# Patient Record
Sex: FEMALE | Race: BLACK OR AFRICAN AMERICAN | NOT HISPANIC OR LATINO | Employment: FULL TIME | URBAN - METROPOLITAN AREA
[De-identification: names, ages, dates, MRNs, and addresses within clinical notes are randomized per-mention and may not be internally consistent; named-entity substitution may affect disease eponyms.]

---

## 2017-01-23 ENCOUNTER — TELEPHONE (OUTPATIENT)
Dept: TRANSPLANT | Facility: CLINIC | Age: 62
End: 2017-01-23

## 2017-01-23 NOTE — TELEPHONE ENCOUNTER
----- Message from Radha Koch sent at 1/23/2017  8:34 AM CST -----  Contact: [facility]  Pt went in for labs this morning but didn't have orders please fax orders to  if needed contact # [phone]

## 2017-01-26 LAB
EXT ALBUMIN: 4.1
EXT ALKALINE PHOSPHATASE: 81
EXT ALT: 23
EXT AST: 26
EXT BASOPHIL%: 0.6
EXT BILIRUBIN TOTAL: 0.38
EXT BUN: 22
EXT CALCIUM: 9.9
EXT CHLORIDE: 103
EXT CO2: 26
EXT EOSINOPHIL%: 3.4
EXT GFR MDRD NON AF AMER: 54
EXT GGT: 26
EXT GLUCOSE: 100
EXT HEMATOCRIT: 39.1
EXT HEMOGLOBIN: 12.3
EXT LYMPH%: 40.6
EXT MONOCYTES%: 5.6
EXT PLATELETS: 231
EXT POTASSIUM: 5.2
EXT PROTEIN TOTAL: 7.6
EXT SEGS%: 49.5
EXT SODIUM: 141 MMOL/L
EXT TACROLIMUS LVL: 5.9
EXT WBC: 3.6

## 2017-01-29 ENCOUNTER — TELEPHONE (OUTPATIENT)
Dept: TRANSPLANT | Facility: CLINIC | Age: 62
End: 2017-01-29

## 2017-03-30 DIAGNOSIS — Z94.4 LIVER REPLACED BY TRANSPLANT: Primary | ICD-10-CM

## 2017-03-30 RX ORDER — URSODIOL 300 MG/1
CAPSULE ORAL
Qty: 60 CAPSULE | Refills: 11 | Status: SHIPPED | OUTPATIENT
Start: 2017-03-30 | End: 2017-03-30 | Stop reason: SDUPTHER

## 2017-03-30 RX ORDER — URSODIOL 300 MG/1
CAPSULE ORAL
Qty: 60 CAPSULE | Refills: 11 | Status: SHIPPED | OUTPATIENT
Start: 2017-03-30 | End: 2019-02-18

## 2017-04-19 LAB
EXT ALBUMIN: 4
EXT ALKALINE PHOSPHATASE: 95
EXT ALT: 37
EXT AST: 33
EXT BASOPHIL%: 0.5
EXT BILIRUBIN TOTAL: 0.32
EXT BUN: 29
EXT CALCIUM: 9.6
EXT CHLORIDE: 102
EXT CO2: 24
EXT CREATININE: 1.46 MG/DL
EXT EOSINOPHIL%: 3.2
EXT GFR MDRD AF AMER: 44
EXT GGT: 31
EXT GLUCOSE: 102
EXT HEMATOCRIT: 38.9
EXT HEMOGLOBIN: 12.1
EXT LYMPH%: 32.6
EXT MONOCYTES%: 8.4
EXT PLATELETS: 202
EXT POTASSIUM: 4.9
EXT PROTEIN TOTAL: 7.5
EXT SEGS%: 55.1
EXT SODIUM: 140 MMOL/L
EXT TACROLIMUS LVL: 4.7
EXT WBC: 4.4

## 2017-04-20 ENCOUNTER — TELEPHONE (OUTPATIENT)
Dept: TRANSPLANT | Facility: CLINIC | Age: 62
End: 2017-04-20

## 2017-07-25 LAB
EXT ALBUMIN: 4.1
EXT ALKALINE PHOSPHATASE: 87
EXT ALT: 24
EXT AST: 30
EXT BASOPHIL%: 38.8
EXT BILIRUBIN TOTAL: 0.44
EXT BUN: 27
EXT CALCIUM: 9.8
EXT CHLORIDE: 102
EXT CO2: 27
EXT CREATININE: 1.54 MG/DL
EXT EOSINOPHIL%: 3.5
EXT GFR MDRD AF AMER: 42
EXT GLUCOSE: 101
EXT HEMATOCRIT: 38.2
EXT HEMOGLOBIN: 12.2
EXT LYMPH%: 38.8
EXT MONOCYTES%: 8.3
EXT PLATELETS: 199
EXT POTASSIUM: 5.2
EXT PROTEIN TOTAL: 7.7
EXT SEGS%: 48.8
EXT SODIUM: 141 MMOL/L
EXT TACROLIMUS LVL: 5.8
EXT WBC: 4.6

## 2017-07-27 ENCOUNTER — TELEPHONE (OUTPATIENT)
Dept: TRANSPLANT | Facility: CLINIC | Age: 62
End: 2017-07-27

## 2017-08-03 DIAGNOSIS — Z94.4 STATUS POST LIVER TRANSPLANT: ICD-10-CM

## 2017-08-03 RX ORDER — MYCOPHENOLATE MOFETIL 250 MG/1
500 CAPSULE ORAL 2 TIMES DAILY
Qty: 120 CAPSULE | Refills: 11 | Status: SHIPPED | OUTPATIENT
Start: 2017-08-03 | End: 2018-08-21 | Stop reason: SDUPTHER

## 2017-08-03 NOTE — TELEPHONE ENCOUNTER
----- Message from Janessa Tejada sent at 8/3/2017  9:27 AM CDT -----  Contact: [facility] pharmacy  Calling to get a new prescription on the patient mycophenolate (CELLCEPT) 250 mg Cap () please call

## 2017-08-15 ENCOUNTER — OFFICE VISIT (OUTPATIENT)
Dept: TRANSPLANT | Facility: CLINIC | Age: 62
End: 2017-08-15
Payer: COMMERCIAL

## 2017-08-15 VITALS
HEIGHT: 67 IN | WEIGHT: 206.56 LBS | RESPIRATION RATE: 16 BRPM | HEART RATE: 65 BPM | DIASTOLIC BLOOD PRESSURE: 74 MMHG | SYSTOLIC BLOOD PRESSURE: 164 MMHG | OXYGEN SATURATION: 99 % | BODY MASS INDEX: 32.42 KG/M2 | TEMPERATURE: 99 F

## 2017-08-15 DIAGNOSIS — Z94.4 LIVER TRANSPLANTED: Primary | ICD-10-CM

## 2017-08-15 DIAGNOSIS — Z29.89 PROPHYLACTIC IMMUNOTHERAPY: ICD-10-CM

## 2017-08-15 PROCEDURE — 99213 OFFICE O/P EST LOW 20 MIN: CPT | Mod: S$GLB,,, | Performed by: NURSE PRACTITIONER

## 2017-08-15 PROCEDURE — 99999 PR PBB SHADOW E&M-EST. PATIENT-LVL III: CPT | Mod: PBBFAC,,, | Performed by: NURSE PRACTITIONER

## 2017-08-15 PROCEDURE — 3008F BODY MASS INDEX DOCD: CPT | Mod: S$GLB,,, | Performed by: NURSE PRACTITIONER

## 2017-08-15 NOTE — Clinical Note
August 15, 2017                     [facility] - Liver Transplant  [address]  [facility] [address]  Phone: [phone]   Patient: Aletha Hopkins   MR Number: [id number]   YOB: 1955   Date of Visit: 8/15/2017       Dear      Thank you for referring Aletha Hopkins to me for evaluation. Attached you will find relevant portions of my assessment and plan of care.    If you have questions, please do not hesitate to call me. I look forward to following Aletha Hopkins along with you.    Sincerely,    Kindra Fountain, NP    Enclosure    If you would like to receive this communication electronically, please contact [email or web address] or [phone] to request [employer] access.    [employer] is a tool which provides read-only access to select patient information with whom you have a relationship. Its easy to use and provides real time access to review your patients record including encounter summaries, notes, results, and demographic information.    If you feel you have received this communication in error or would no longer like to receive these types of communications, please e-mail [email or web address]

## 2017-08-15 NOTE — PROGRESS NOTES
Transplant Hepatology  Liver Transplant Recipient Follow-up    Transplant Date: 2009  UNOS Native Liver Dx: Cirrhosis: Autoimmune    Aletha is here for follow up of her liver transplant.    ORGAN: LIVER  Whole or Partial: whole liver  Donor Type:  - brain death  CDC High Risk: no  Donor CMV Status: Positive  Donor HCV Status: Negative  Donor HBcAb: Negative  Biliary Anastomosis:   Arterial Anatomy:   IVC reconstruction:   Portal vein status:     She has had the following complications since transplant: none.     Subjective:     Interval History: Aletha was last seen on 16. Currently, she is doing well. Current complaints include none.    Had some mild RI after tx but improved with decreased dose of tacro  Currently maintained on dual IS w/ tacro 2/2 and  bid  No c/o jaundice, dark urine, abdominal distension, edema    Health Maintenance  -Bone mineral density - last BMD ~, has upcoming appt with PCP  -Colonoscopy- reportedly normal ~ next due 2018  -Dermatology- educated on skin care   -HTN- BP today was 164/74- has upcoming appt w/ PCP     Review of Systems   Constitutional: Negative for activity change, appetite change, chills, diaphoresis, fatigue, fever and unexpected weight change.   HENT: Negative for facial swelling and nosebleeds.    Respiratory: Negative for cough, chest tightness and shortness of breath.    Cardiovascular: Negative for chest pain, palpitations and leg swelling.   Gastrointestinal: Negative for abdominal distention, abdominal pain, blood in stool, constipation, diarrhea, nausea and vomiting.   Musculoskeletal: Negative for neck pain and neck stiffness.   Skin: Negative for color change, pallor and rash.   Neurological: Negative for dizziness, tremors, syncope, weakness, light-headedness and headaches.   Hematological: Negative for adenopathy. Does not bruise/bleed easily.   Psychiatric/Behavioral: Negative for agitation, behavioral problems, confusion and  decreased concentration.       Objective:     Physical Exam   Constitutional: She is oriented to person, place, and time. She appears well-developed and well-nourished. No distress.   HENT:   Head: Normocephalic and atraumatic.   Eyes: Conjunctivae are normal. No scleral icterus.   Neck: Normal range of motion. Neck supple.   Cardiovascular: Normal rate.    Pulmonary/Chest: Effort normal.   Abdominal: Soft. She exhibits no distension and no mass. There is no tenderness. There is no rebound and no guarding.   Musculoskeletal: Normal range of motion.   Neurological: She is alert and oriented to person, place, and time. Coordination normal.   No asterixis   Skin: Skin is warm and dry. No rash noted. She is not diaphoretic. No erythema. No pallor.        Surgical incision healed well, no hernia appreciated   Psychiatric: She has a normal mood and affect. Her behavior is normal. Judgment and thought content normal.     Lab Results   Component Value Date    BILITOT 0.5 03/30/2009    AST 37 03/30/2009    ALT 84 (H) 03/30/2009    ALKPHOS 91 03/30/2009    CREATININE 1.3 03/30/2009    ALBUMIN 4.4 03/30/2009     Lab Results   Component Value Date    WBC 6.38 03/30/2009    HGB 9.0 (L) 03/30/2009    HCT 28.2 (L) 03/30/2009     03/30/2009     Lab Results   Component Value Date    TACROLIMUS 8.1 03/30/2009       Assessment/Plan:     1. Liver transplanted for AIH    2. Prophylactic immunotherapy        S/P liver transplant due to AIH : stable LFTs. Continue monitoring symptoms, labs and drug levels for drug-related toxicity and side effects  -continue with post transplant labs per protocol  IS: Chronic immunosuppressive medications for rejection prophylaxis at target.   no adjustment needed.   Maintenance:  -Instructed to f/u with PCP for regular health maintenance care including cancer screenings and BMD  -Reviewed need to avoid sun exposure with use of sunblock, hats, long sleeves related to increased risk of skin  cancers  -Recommend f/u with Dermatology for annual skin checks    RTC 1 year    CASSY KothariOS Patient Status  Functional Status: 100% - Normal, no complaints, no evidence of disease  Physical Capacity: No Limitations

## 2017-08-30 ENCOUNTER — TELEPHONE (OUTPATIENT)
Dept: TRANSPLANT | Facility: CLINIC | Age: 62
End: 2017-08-30

## 2017-08-30 DIAGNOSIS — Z94.4 LIVER REPLACED BY TRANSPLANT: ICD-10-CM

## 2017-08-30 RX ORDER — TACROLIMUS 1 MG/1
CAPSULE ORAL
Qty: 120 CAPSULE | Refills: 11 | Status: SHIPPED | OUTPATIENT
Start: 2017-08-30 | End: 2018-09-16 | Stop reason: SDUPTHER

## 2017-08-30 NOTE — TELEPHONE ENCOUNTER
----- Message from Kindra Fountain NP sent at 8/15/2017  2:27 PM CDT -----  rtc 1 year with Dr. GOMEZ

## 2017-09-01 ENCOUNTER — TELEPHONE (OUTPATIENT)
Dept: TRANSPLANT | Facility: CLINIC | Age: 62
End: 2017-09-01

## 2017-09-01 NOTE — TELEPHONE ENCOUNTER
----- Message from Kindra Fountain NP sent at 8/15/2017  4:32 PM CDT -----  Lets reduce tacro from 2/2 to 2/1 for elevated creatinine  Can recheck labs in 1 week    thanks  ----- Message -----  From: Bryson Brand MD  Sent: 8/15/2017   4:16 PM  To: Kindra Fountain NP    Reduce her tacro a little    ----- Message -----  From: Kindra Fountain NP  Sent: 8/15/2017   2:30 PM  To: Bryson Brand MD    I reviewed her labs from July shows consistent upward trend of creatinine, now 1.5,. LFTs are ok  She also has HTN and has not been taking her meds, instructed to check BP at home and restart  Hx of RI improved with decreased tacro  IS: tacro 2/2, cellcept 500 bid    Should we wait and see what October labs look like or would you suggest reducing tacro?

## 2017-10-23 ENCOUNTER — TELEPHONE (OUTPATIENT)
Dept: TRANSPLANT | Facility: CLINIC | Age: 62
End: 2017-10-23

## 2017-10-23 NOTE — TELEPHONE ENCOUNTER
----- Message from Janessa Tejada sent at 10/23/2017  3:15 PM CDT -----  Contact: [facility]   Calling to get a standing order faxed for this patient labs, please make sure the date is up to date.       Please fax  or call

## 2017-11-03 ENCOUNTER — TELEPHONE (OUTPATIENT)
Dept: TRANSPLANT | Facility: CLINIC | Age: 62
End: 2017-11-03

## 2017-11-03 LAB
EXT ALBUMIN: 4.2
EXT ALKALINE PHOSPHATASE: 88
EXT ALT: 22
EXT AST: 26
EXT BASOPHIL%: 1
EXT BILIRUBIN TOTAL: 0.44
EXT BUN: 29
EXT CALCIUM: 9.5
EXT CHLORIDE: 101
EXT CO2: 26
EXT CREATININE: 1.38 MG/DL
EXT EOSINOPHIL%: 2.6
EXT GFR MDRD AF AMER: 47
EXT GFR MDRD NON AF AMER: 47
EXT GLUCOSE: 97
EXT HEMATOCRIT: 38.2
EXT HEMOGLOBIN: 12.8
EXT LYMPH%: 34
EXT MONOCYTES%: 7.6
EXT PLATELETS: 194
EXT POTASSIUM: 4.7
EXT PROTEIN TOTAL: 7.3
EXT SEGS%: 54.4
EXT SODIUM: 141 MMOL/L
EXT TACROLIMUS LVL: 3.8
EXT WBC: 4.2

## 2018-01-31 LAB
EXT ALBUMIN: 4.2
EXT ALKALINE PHOSPHATASE: 81
EXT ALT: 33
EXT AST: 34
EXT BASOPHIL%: 0.9
EXT BILIRUBIN TOTAL: 0.38
EXT BUN: 32
EXT CALCIUM: 9.7
EXT CHLORIDE: 102
EXT CO2: 28
EXT CREATININE: 1.52 MG/DL
EXT EOSINOPHIL%: 2.4
EXT GFR MDRD AF AMER: 42
EXT GFR MDRD NON AF AMER: 42
EXT GGT: 30
EXT GLUCOSE: 105
EXT HEMATOCRIT: 37.7
EXT HEMOGLOBIN: 12.2
EXT LYMPH%: 36.3
EXT MONOCYTES%: 7.5
EXT PLATELETS: 200
EXT POTASSIUM: 5.2
EXT PROTEIN TOTAL: 7.6
EXT SEGS%: 52.7
EXT SODIUM: 140 MMOL/L
EXT TACROLIMUS LVL: 4.2
EXT WBC: 4.6

## 2018-02-01 ENCOUNTER — TELEPHONE (OUTPATIENT)
Dept: TRANSPLANT | Facility: CLINIC | Age: 63
End: 2018-02-01

## 2018-05-09 ENCOUNTER — TELEPHONE (OUTPATIENT)
Dept: TRANSPLANT | Facility: CLINIC | Age: 63
End: 2018-05-09

## 2018-05-15 LAB
EXT ALBUMIN: 4.1
EXT ALKALINE PHOSPHATASE: 81
EXT ALT: 22
EXT AST: 26
EXT BASOPHIL%: 0.7
EXT BILIRUBIN TOTAL: 0.47
EXT BUN: 24
EXT CALCIUM: 9.9
EXT CHLORIDE: 104
EXT CO2: 27
EXT CREATININE: 1.39 MG/DL
EXT EOSINOPHIL%: 3.9
EXT GFR MDRD AF AMER: 47
EXT GLUCOSE: 103
EXT HEMATOCRIT: 37.7
EXT HEMOGLOBIN: 12.2
EXT LYMPH%: 36.7
EXT MONOCYTES%: 7.7
EXT PLATELETS: 184
EXT POTASSIUM: 5.1
EXT PROTEIN TOTAL: 7.9
EXT SEGS%: 50.8
EXT SODIUM: 140 MMOL/L
EXT TACROLIMUS LVL: 4.7
EXT WBC: 4.3

## 2018-05-16 ENCOUNTER — TELEPHONE (OUTPATIENT)
Dept: TRANSPLANT | Facility: CLINIC | Age: 63
End: 2018-05-16

## 2018-08-20 ENCOUNTER — OFFICE VISIT (OUTPATIENT)
Dept: TRANSPLANT | Facility: CLINIC | Age: 63
End: 2018-08-20
Payer: COMMERCIAL

## 2018-08-20 VITALS
RESPIRATION RATE: 18 BRPM | TEMPERATURE: 98 F | SYSTOLIC BLOOD PRESSURE: 132 MMHG | HEIGHT: 67 IN | OXYGEN SATURATION: 99 % | HEART RATE: 64 BPM | BODY MASS INDEX: 33.32 KG/M2 | WEIGHT: 212.31 LBS | DIASTOLIC BLOOD PRESSURE: 77 MMHG

## 2018-08-20 DIAGNOSIS — Z94.4 LIVER TRANSPLANTED: ICD-10-CM

## 2018-08-20 DIAGNOSIS — E66.9 OBESITY, UNSPECIFIED CLASSIFICATION, UNSPECIFIED OBESITY TYPE, UNSPECIFIED WHETHER SERIOUS COMORBIDITY PRESENT: ICD-10-CM

## 2018-08-20 DIAGNOSIS — Z29.89 PROPHYLACTIC IMMUNOTHERAPY: Primary | ICD-10-CM

## 2018-08-20 PROCEDURE — 99214 OFFICE O/P EST MOD 30 MIN: CPT | Mod: S$GLB,,, | Performed by: INTERNAL MEDICINE

## 2018-08-20 PROCEDURE — 3008F BODY MASS INDEX DOCD: CPT | Mod: CPTII,S$GLB,, | Performed by: INTERNAL MEDICINE

## 2018-08-20 PROCEDURE — 99999 PR PBB SHADOW E&M-EST. PATIENT-LVL III: CPT | Mod: PBBFAC,,, | Performed by: INTERNAL MEDICINE

## 2018-08-20 NOTE — PROGRESS NOTES
Subjective:       Patient ID: Aletha Hopkins is a 62 y.o. female.    Chief Complaint: Liver Transplant Follow-up    HPI   I saw this 62 year old AA lady in the liver transplant follow up clinic. 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 She is now 9 years post OLT.    She has done well with no complications since then other than mild renal impairment which has improved with a decrease in the dose of her tacrolimus.    Labs: 2018  Creatinine 1.3  AST 22  ALT 26  ALP 81      Latest tac level was 4.7  She remains on tac 2 mg BID and MMF 500mg BID    Health Maintenance  -Bone mineral density - last BMD ~  -Colonoscopy- reportedly normal ~ next due 2018  -Dermatology- educated on skin care     Sees:  Gyn- Dr Juaquin Florentino  PCP- [facility]-  Dr Rosa Barriga- DEXA etc    Body mass index is 33.32 kg/m².      Lab Results   Component Value Date    ALT 84 (H) 2009     Past Medical History:   Diagnosis Date    Liver transplanted 2009    Autoimmune     History reviewed. No pertinent surgical history.  Current Outpatient Medications   Medication Sig    CALCIUM CARBONATE/VITAMIN D3 (CALCIUM WITH VITAMIN D3 ORAL) Take 1 capsule by mouth once daily. Bone strength calcium, magnesium, vit D3 and vit K    multivitamin (THERAGRAN) per tablet Take 1 tablet by mouth. 1 Tablet Oral Every day.  over the counter    tacrolimus (PROGRAF) 1 MG Cap TAKE 2 CAPSULES(2MG) BY MOUTH EVERY 12 HOURS (MORNING AND EVENING)    ursodiol (ACTIGALL) 300 mg capsule TAKE 1 CAPSULE BY MOUTH TWICE DAILY    mycophenolate (CELLCEPT) 250 mg Cap Take 2 capsules (500 mg total) by mouth 2 (two) times daily.     No current facility-administered medications for this visit.        Review of Systems   Constitutional: Negative for activity change, appetite change, chills, fatigue, fever and unexpected weight change.   HENT: Negative for ear pain, hearing loss, nosebleeds, sore throat and trouble  swallowing.    Eyes: Negative for redness and visual disturbance.   Respiratory: Negative for cough, chest tightness, shortness of breath and wheezing.    Cardiovascular: Negative for chest pain and palpitations.   Gastrointestinal: Negative for abdominal distention, abdominal pain, blood in stool, constipation, diarrhea, nausea and vomiting.   Genitourinary: Negative for difficulty urinating, dysuria, frequency, hematuria and urgency.   Musculoskeletal: Negative for arthralgias, back pain, gait problem, joint swelling and myalgias.   Skin: Negative for rash.   Neurological: Negative for tremors, seizures, speech difficulty, weakness and headaches.   Hematological: Negative for adenopathy.   Psychiatric/Behavioral: Negative for confusion, decreased concentration and sleep disturbance. The patient is not nervous/anxious.          Objective:      Physical Exam   Constitutional: She is oriented to person, place, and time. She appears well-developed and well-nourished.   HENT:   Head: Normocephalic and atraumatic.   Mouth/Throat: Oropharynx is clear and moist.   Eyes: Conjunctivae and EOM are normal. Pupils are equal, round, and reactive to light. No scleral icterus.   Neck: Normal range of motion. Neck supple.   Cardiovascular: Normal rate, regular rhythm and normal heart sounds.   Pulmonary/Chest: Effort normal and breath sounds normal.   Abdominal: Soft. Bowel sounds are normal. She exhibits no distension. There is no tenderness.   Musculoskeletal: Normal range of motion. She exhibits no edema.   Neurological: She is alert and oriented to person, place, and time.   Psychiatric: She has a normal mood and affect. Her behavior is normal.   Vitals reviewed.      Assessment:       1. Prophylactic immunotherapy    2. Obesity, unspecified classification, unspecified obesity type, unspecified whether serious comorbidity present    3. Liver transplanted for AIH        Plan:   1. No change in immunosuppresion  2. PCP and Gyne  doc locally- will follow with maintenance tests  3. Labs every 3 months    Clinic in 1 year.    Functional Status: 100% - Normal, no complaints, no evidence of disease  Physical Capacity: No Limitations

## 2018-08-21 DIAGNOSIS — Z94.4 STATUS POST LIVER TRANSPLANT: ICD-10-CM

## 2018-08-23 RX ORDER — MYCOPHENOLATE MOFETIL 250 MG/1
CAPSULE ORAL
Qty: 120 CAPSULE | Refills: 2 | Status: SHIPPED | OUTPATIENT
Start: 2018-08-23 | End: 2018-11-22 | Stop reason: SDUPTHER

## 2018-09-05 LAB
EXT ALBUMIN: 4.1
EXT ALKALINE PHOSPHATASE: 88
EXT ALT: 23
EXT AST: 28
EXT BASOPHIL%: 0.6
EXT BILIRUBIN TOTAL: 0.43
EXT BUN: 32
EXT CALCIUM: 10
EXT CHLORIDE: 102
EXT CO2: 27
EXT CREATININE: 1.56 MG/DL
EXT EOSINOPHIL%: 1.9
EXT GFR MDRD AF AMER: 41
EXT GLUCOSE: 98
EXT HEMATOCRIT: 37.4
EXT HEMOGLOBIN: 12
EXT LYMPH%: 38.7
EXT MONOCYTES%: 6.4
EXT PLATELETS: 196
EXT POTASSIUM: 5.1
EXT PROTEIN TOTAL: 7.7
EXT SEGS%: 52.2
EXT SODIUM: 140 MMOL/L
EXT TACROLIMUS LVL: 5
EXT WBC: 4.8

## 2018-09-06 ENCOUNTER — TELEPHONE (OUTPATIENT)
Dept: TRANSPLANT | Facility: CLINIC | Age: 63
End: 2018-09-06

## 2018-09-16 DIAGNOSIS — Z94.4 LIVER REPLACED BY TRANSPLANT: ICD-10-CM

## 2018-09-17 RX ORDER — TACROLIMUS 1 MG/1
CAPSULE ORAL
Qty: 120 CAPSULE | Refills: 0 | Status: SHIPPED | OUTPATIENT
Start: 2018-09-17 | End: 2018-10-10 | Stop reason: SDUPTHER

## 2018-10-10 DIAGNOSIS — Z94.4 LIVER REPLACED BY TRANSPLANT: ICD-10-CM

## 2018-10-10 RX ORDER — TACROLIMUS 1 MG/1
CAPSULE ORAL
Qty: 120 CAPSULE | Refills: 0 | Status: SHIPPED | OUTPATIENT
Start: 2018-10-10 | End: 2018-11-22 | Stop reason: SDUPTHER

## 2018-11-22 DIAGNOSIS — Z94.4 LIVER REPLACED BY TRANSPLANT: ICD-10-CM

## 2018-11-22 DIAGNOSIS — Z94.4 STATUS POST LIVER TRANSPLANT: ICD-10-CM

## 2018-11-22 RX ORDER — TACROLIMUS 1 MG/1
CAPSULE ORAL
Qty: 120 CAPSULE | Refills: 0 | Status: SHIPPED | OUTPATIENT
Start: 2018-11-22 | End: 2018-12-19 | Stop reason: SDUPTHER

## 2018-11-22 RX ORDER — MYCOPHENOLATE MOFETIL 250 MG/1
CAPSULE ORAL
Qty: 120 CAPSULE | Refills: 0 | Status: SHIPPED | OUTPATIENT
Start: 2018-11-22 | End: 2018-12-19 | Stop reason: SDUPTHER

## 2018-12-04 LAB
EXT ALBUMIN: 4.4
EXT ALKALINE PHOSPHATASE: 91
EXT ALT: 22
EXT AST: 24
EXT BASOPHIL%: 0.7
EXT BILIRUBIN TOTAL: 0.32
EXT BUN: 21
EXT CALCIUM: 1.35
EXT CHLORIDE: 103
EXT CO2: 26
EXT CREATININE: 1 MG/DL
EXT EOSINOPHIL%: 3.5
EXT GLUCOSE: 100
EXT HEMATOCRIT: 36.5
EXT HEMOGLOBIN: 11.9
EXT LYMPH%: 33.2
EXT MONOCYTES%: 7.5
EXT PLATELETS: 207
EXT POTASSIUM: 5.2
EXT PROTEIN TOTAL: 7.7
EXT SEGS%: 54.9
EXT SODIUM: 141 MMOL/L
EXT TACROLIMUS LVL: 5.7
EXT WBC: 4.3

## 2018-12-14 ENCOUNTER — TELEPHONE (OUTPATIENT)
Dept: TRANSPLANT | Facility: CLINIC | Age: 63
End: 2018-12-14

## 2018-12-19 DIAGNOSIS — Z94.4 STATUS POST LIVER TRANSPLANT: ICD-10-CM

## 2018-12-19 DIAGNOSIS — Z94.4 LIVER REPLACED BY TRANSPLANT: ICD-10-CM

## 2018-12-19 RX ORDER — MYCOPHENOLATE MOFETIL 250 MG/1
CAPSULE ORAL
Qty: 120 CAPSULE | Refills: 0 | Status: SHIPPED | OUTPATIENT
Start: 2018-12-19 | End: 2019-01-17 | Stop reason: SDUPTHER

## 2018-12-19 RX ORDER — TACROLIMUS 1 MG/1
CAPSULE ORAL
Qty: 120 CAPSULE | Refills: 0 | Status: SHIPPED | OUTPATIENT
Start: 2018-12-19 | End: 2019-01-17 | Stop reason: SDUPTHER

## 2019-01-17 DIAGNOSIS — Z94.4 LIVER REPLACED BY TRANSPLANT: ICD-10-CM

## 2019-01-17 DIAGNOSIS — Z94.4 STATUS POST LIVER TRANSPLANT: ICD-10-CM

## 2019-01-17 RX ORDER — MYCOPHENOLATE MOFETIL 250 MG/1
CAPSULE ORAL
Qty: 120 CAPSULE | Refills: 0 | Status: SHIPPED | OUTPATIENT
Start: 2019-01-17 | End: 2019-02-11 | Stop reason: SDUPTHER

## 2019-01-17 RX ORDER — TACROLIMUS 1 MG/1
CAPSULE ORAL
Qty: 120 CAPSULE | Refills: 0 | Status: SHIPPED | OUTPATIENT
Start: 2019-01-17 | End: 2019-02-11 | Stop reason: SDUPTHER

## 2019-02-11 DIAGNOSIS — Z94.4 STATUS POST LIVER TRANSPLANT: ICD-10-CM

## 2019-02-11 DIAGNOSIS — Z94.4 LIVER REPLACED BY TRANSPLANT: ICD-10-CM

## 2019-02-11 RX ORDER — TACROLIMUS 1 MG/1
CAPSULE ORAL
Qty: 120 CAPSULE | Refills: 0 | Status: SHIPPED | OUTPATIENT
Start: 2019-02-11 | End: 2020-01-24

## 2019-02-11 RX ORDER — MYCOPHENOLATE MOFETIL 250 MG/1
CAPSULE ORAL
Qty: 120 CAPSULE | Refills: 0 | Status: SHIPPED | OUTPATIENT
Start: 2019-02-11 | End: 2019-03-26 | Stop reason: SDUPTHER

## 2019-02-18 DIAGNOSIS — Z94.4 LIVER REPLACED BY TRANSPLANT: ICD-10-CM

## 2019-02-18 RX ORDER — URSODIOL 300 MG/1
CAPSULE ORAL
Qty: 60 CAPSULE | Refills: 11 | Status: SHIPPED | OUTPATIENT
Start: 2019-02-18 | End: 2020-02-24

## 2019-03-05 DIAGNOSIS — Z94.4 LIVER REPLACED BY TRANSPLANT: ICD-10-CM

## 2019-03-06 RX ORDER — TACROLIMUS 1 MG/1
CAPSULE ORAL
Qty: 120 CAPSULE | Refills: 0 | Status: SHIPPED | OUTPATIENT
Start: 2019-03-06 | End: 2019-03-31 | Stop reason: SDUPTHER

## 2019-03-26 DIAGNOSIS — Z94.4 STATUS POST LIVER TRANSPLANT: ICD-10-CM

## 2019-03-26 RX ORDER — MYCOPHENOLATE MOFETIL 250 MG/1
CAPSULE ORAL
Qty: 120 CAPSULE | Refills: 0 | Status: SHIPPED | OUTPATIENT
Start: 2019-03-26 | End: 2019-04-23 | Stop reason: SDUPTHER

## 2019-03-31 DIAGNOSIS — Z94.4 LIVER REPLACED BY TRANSPLANT: ICD-10-CM

## 2019-03-31 RX ORDER — TACROLIMUS 1 MG/1
CAPSULE ORAL
Qty: 120 CAPSULE | Refills: 0 | Status: SHIPPED | OUTPATIENT
Start: 2019-03-31 | End: 2019-04-23 | Stop reason: SDUPTHER

## 2019-04-01 LAB
BASOPHILS NFR BLD: 1 %
EOSINOPHIL NFR BLD: 3 %
EXT ALBUMIN: 4.3
EXT ALKALINE PHOSPHATASE: 86
EXT ALT: 21
EXT AST: 23
EXT BILIRUBIN TOTAL: 7.8
EXT BUN: 18
EXT CALCIUM: 9.8
EXT CHLORIDE: 101
EXT CO2: 27
EXT CREATININE: 1.24 MG/DL
EXT GLUCOSE: 97
EXT POTASSIUM: 4.4
EXT PROTEIN TOTAL: 7.8
EXT SODIUM: 140 MMOL/L
EXT TACROLIMUS LVL: 5.6
HCT VFR BLD AUTO: 39 % (ref 36–46)
HGB BLD-MCNC: 12 G/DL (ref 12–16)
NEUTROPHILS NFR BLD: 55.2 %
PLATELET # BLD AUTO: 192 K/?L (ref 150–399)
WBC: 4.2

## 2019-04-02 ENCOUNTER — TELEPHONE (OUTPATIENT)
Dept: TRANSPLANT | Facility: CLINIC | Age: 64
End: 2019-04-02

## 2019-04-23 DIAGNOSIS — Z94.4 LIVER REPLACED BY TRANSPLANT: ICD-10-CM

## 2019-04-23 DIAGNOSIS — Z94.4 STATUS POST LIVER TRANSPLANT: ICD-10-CM

## 2019-04-23 RX ORDER — TACROLIMUS 1 MG/1
CAPSULE ORAL
Qty: 120 CAPSULE | Refills: 0 | Status: SHIPPED | OUTPATIENT
Start: 2019-04-23 | End: 2019-06-03 | Stop reason: SDUPTHER

## 2019-04-23 RX ORDER — MYCOPHENOLATE MOFETIL 250 MG/1
CAPSULE ORAL
Qty: 120 CAPSULE | Refills: 0 | Status: SHIPPED | OUTPATIENT
Start: 2019-04-23 | End: 2019-06-03 | Stop reason: SDUPTHER

## 2019-06-03 DIAGNOSIS — Z94.4 LIVER REPLACED BY TRANSPLANT: ICD-10-CM

## 2019-06-03 DIAGNOSIS — Z94.4 STATUS POST LIVER TRANSPLANT: ICD-10-CM

## 2019-06-03 RX ORDER — TACROLIMUS 1 MG/1
CAPSULE ORAL
Qty: 120 CAPSULE | Refills: 0 | Status: SHIPPED | OUTPATIENT
Start: 2019-06-03 | End: 2019-07-02 | Stop reason: SDUPTHER

## 2019-06-03 RX ORDER — MYCOPHENOLATE MOFETIL 250 MG/1
CAPSULE ORAL
Qty: 120 CAPSULE | Refills: 0 | Status: SHIPPED | OUTPATIENT
Start: 2019-06-03 | End: 2019-10-31 | Stop reason: SDUPTHER

## 2019-07-02 DIAGNOSIS — Z94.4 LIVER REPLACED BY TRANSPLANT: ICD-10-CM

## 2019-07-02 RX ORDER — TACROLIMUS 1 MG/1
CAPSULE ORAL
Qty: 120 CAPSULE | Refills: 0 | Status: SHIPPED | OUTPATIENT
Start: 2019-07-02 | End: 2019-07-15 | Stop reason: SDUPTHER

## 2019-07-15 DIAGNOSIS — Z94.4 LIVER REPLACED BY TRANSPLANT: ICD-10-CM

## 2019-07-15 RX ORDER — TACROLIMUS 1 MG/1
2 CAPSULE ORAL EVERY 12 HOURS
Qty: 120 CAPSULE | Refills: 11 | Status: SHIPPED | OUTPATIENT
Start: 2019-07-15 | End: 2020-01-24

## 2019-07-15 NOTE — TELEPHONE ENCOUNTER
----- Message from Marilyn Lynn sent at 7/15/2019 11:26 AM CDT -----  Contact: [facility]  Please call [facility] at [phone]     Fax# [phone]    Refill request for tacrolimus (PROGRAF) 1 MG Cap    Thank you

## 2019-07-19 ENCOUNTER — TELEPHONE (OUTPATIENT)
Dept: TRANSPLANT | Facility: CLINIC | Age: 64
End: 2019-07-19

## 2019-07-19 LAB
EXT ALBUMIN: 4.2
EXT ALKALINE PHOSPHATASE: 88
EXT ALT: 44
EXT AST: 80
EXT BASOPHIL%: 0.6
EXT BILIRUBIN TOTAL: 0.56
EXT BUN: 23
EXT CALCIUM: 10.2
EXT CHLORIDE: 102
EXT CO2: 25
EXT CREATININE: 1.34 MG/DL
EXT EOSINOPHIL%: 3.4
EXT GLUCOSE: 106
EXT HEMATOCRIT: 37.9
EXT HEMOGLOBIN: 11.8
EXT LYMPH%: 53.4
EXT MONOCYTES%: 9.2
EXT PLATELETS: 197
EXT POTASSIUM: 5
EXT PROTEIN TOTAL: 7.7
EXT SEGS%: 53.4
EXT SODIUM: 141 MMOL/L
EXT TACROLIMUS LVL: 4.2
EXT WBC: 5.3

## 2019-07-19 NOTE — TELEPHONE ENCOUNTER
----- Message from Bryson Brand MD sent at 7/19/2019  4:27 PM CDT -----  Results reviewed  Repeat next week- LFTs are up

## 2019-07-25 ENCOUNTER — TELEPHONE (OUTPATIENT)
Dept: TRANSPLANT | Facility: CLINIC | Age: 64
End: 2019-07-25

## 2019-07-25 LAB
EXT ALBUMIN: 4.3
EXT ALKALINE PHOSPHATASE: 86
EXT ALT: 27
EXT AST: 25
EXT BASOPHIL%: 0.8
EXT BILIRUBIN TOTAL: 0.5
EXT BUN: 26
EXT CALCIUM: 10.2
EXT CHLORIDE: 101
EXT CO2: 27
EXT CREATININE: 1.49 MG/DL
EXT EOSINOPHIL%: 3.2
EXT GFR MDRD AF AMER: 43
EXT GFR MDRD NON AF AMER: 43
EXT GLUCOSE: 104
EXT HEMATOCRIT: 38.1
EXT HEMOGLOBIN: 12
EXT LYMPH%: 37.3
EXT MONOCYTES%: 6.7
EXT PLATELETS: 206
EXT POTASSIUM: 4.9
EXT PROTEIN TOTAL: 8.1
EXT SEGS%: 51.8
EXT SODIUM: 139 MMOL/L
EXT TACROLIMUS LVL: 3.9
EXT WBC: 4.8

## 2019-08-02 ENCOUNTER — TELEPHONE (OUTPATIENT)
Dept: TRANSPLANT | Facility: CLINIC | Age: 64
End: 2019-08-02

## 2019-08-02 NOTE — TELEPHONE ENCOUNTER
Patient sent me an email requesting lab review follow up.  Emailed patient that Dr. Guevara reviewed her labs and they have returned to stable.  Next labs 10/21/19

## 2019-09-27 ENCOUNTER — TELEPHONE (OUTPATIENT)
Dept: TRANSPLANT | Facility: CLINIC | Age: 64
End: 2019-09-27

## 2019-09-27 NOTE — TELEPHONE ENCOUNTER
----- Message from Missy Bolanos sent at 9/27/2019 10:17 AM CDT -----  Contact: pt  Pt calling to schedule annual appt       Pt contact [phone]

## 2019-10-21 ENCOUNTER — TELEPHONE (OUTPATIENT)
Dept: TRANSPLANT | Facility: CLINIC | Age: 64
End: 2019-10-21

## 2019-10-21 LAB
EXT ALBUMIN: 4.2
EXT ALKALINE PHOSPHATASE: 90
EXT ALT: 19
EXT AST: 22
EXT BASOPHIL%: 0.8
EXT BILIRUBIN TOTAL: 0.39
EXT BUN: 24
EXT CALCIUM: 9.8
EXT CHLORIDE: 102
EXT CO2: 26
EXT CREATININE: 1.33 MG/DL
EXT EOSINOPHIL%: 2.6
EXT GLUCOSE: 104
EXT HEMATOCRIT: 38.5
EXT HEMOGLOBIN: 12
EXT LYMPH%: 36.6
EXT MONOCYTES%: 8.8
EXT PLATELETS: 209
EXT POTASSIUM: 4.7
EXT PROTEIN TOTAL: 7.6
EXT SEGS%: 50.8
EXT SODIUM: 141 MMOL/L
EXT TACROLIMUS LVL: 5.2
EXT WBC: 5

## 2019-10-30 ENCOUNTER — OFFICE VISIT (OUTPATIENT)
Dept: TRANSPLANT | Facility: CLINIC | Age: 64
End: 2019-10-30
Payer: COMMERCIAL

## 2019-10-30 VITALS
WEIGHT: 211.44 LBS | RESPIRATION RATE: 16 BRPM | TEMPERATURE: 98 F | BODY MASS INDEX: 33.19 KG/M2 | DIASTOLIC BLOOD PRESSURE: 85 MMHG | OXYGEN SATURATION: 99 % | SYSTOLIC BLOOD PRESSURE: 154 MMHG | HEIGHT: 67 IN | HEART RATE: 67 BPM

## 2019-10-30 DIAGNOSIS — Z29.89 PROPHYLACTIC IMMUNOTHERAPY: ICD-10-CM

## 2019-10-30 DIAGNOSIS — Z94.4 LIVER TRANSPLANTED: Primary | ICD-10-CM

## 2019-10-30 DIAGNOSIS — E66.9 CLASS 1 OBESITY WITHOUT SERIOUS COMORBIDITY WITH BODY MASS INDEX (BMI) OF 32.0 TO 32.9 IN ADULT, UNSPECIFIED OBESITY TYPE: ICD-10-CM

## 2019-10-30 PROCEDURE — 3008F BODY MASS INDEX DOCD: CPT | Mod: S$GLB,,, | Performed by: NURSE PRACTITIONER

## 2019-10-30 PROCEDURE — 99999 PR PBB SHADOW E&M-EST. PATIENT-LVL IV: CPT | Mod: PBBFAC,,, | Performed by: NURSE PRACTITIONER

## 2019-10-30 PROCEDURE — 99999 PR PBB SHADOW E&M-EST. PATIENT-LVL IV: ICD-10-PCS | Mod: PBBFAC,,, | Performed by: NURSE PRACTITIONER

## 2019-10-30 PROCEDURE — 3008F PR BODY MASS INDEX (BMI) DOCUMENTED: ICD-10-PCS | Mod: S$GLB,,, | Performed by: NURSE PRACTITIONER

## 2019-10-30 PROCEDURE — 99214 OFFICE O/P EST MOD 30 MIN: CPT | Mod: S$GLB,,, | Performed by: NURSE PRACTITIONER

## 2019-10-30 PROCEDURE — 99214 PR OFFICE/OUTPT VISIT, EST, LEVL IV, 30-39 MIN: ICD-10-PCS | Mod: S$GLB,,, | Performed by: NURSE PRACTITIONER

## 2019-10-30 RX ORDER — AMOXICILLIN 500 MG
1 CAPSULE ORAL DAILY
COMMUNITY

## 2019-10-30 NOTE — PATIENT INSTRUCTIONS
1. Schedule colonoscopy  2. Dermatology apt for skin cancer screening  3. Transplant labs every 3 months. No changes to transplant meds.   4. Try decreasing daily carbohydrates (bread, crackers, pasta, rice). Replace with more proteins and vegetables.  5. Return to clinic in 1 year.       For itching:   Avoid:  - Excessive washing and bathing, which can increase skin dryness  - Contact with irritants  - Very hot and spicy foods    Apply:   - Topical lotions that contain tannins, camphor, or menthol (Sarna lotion)  - Skin moisturizers (Eucerin)   - Mild, nonalkaline soaps and lukewarm water. Try free/clear laundry detergents and non-fragrance soaps.   - Wet, cold, or moist wraps  - Aloe    Can try benadryl at night if itching persists.

## 2019-10-30 NOTE — PROGRESS NOTES
Transplant Hepatology  Liver Transplant Recipient Follow-up    Transplant Date: 1/6/2009  UNOS Native Liver Dx: Cirrhosis: Autoimmune    Aletha is here for follow up of her liver transplant.    ORGAN: LIVER  Whole or Partial: whole liver  Donor Type: donation after brain death  CDC High Risk: no  Donor CMV Status: Positive  Donor HCV Status: Negative  Donor HBcAb: Negative  Donor HBV TICO:   Donor HCV TICO:   Biliary Anastomosis:   Arterial Anatomy:   IVC reconstruction:   Portal vein status:     She has had the following complications since transplant: mild renal insufficiency which has improved with decreasing prograf dosing.     Subjective:     Interval History: Aletha was last seen on 8/20/18 by Dr. Brand. She is now 10 years s/p liver transplant for cirrhosis due to autoimmune hepatitis. Currently, she is doing well. Current complaints include intermittent itching, mainly abdomen and back. She has not tried anything for this. She also reports some limited ROM of her RUE; has some tightness in the muscles in her back though no pain. This has been present since transplant. Otherwise well with no new issues.     Interval hospitalizations: none     Current immunosuppression:   - Tacrolimus: 2 mg / 2 mg. Last prograf level 5.2.                      - MMF: 500 mg BID                                                                               Graft Function: excellent; LFTs normal   History of rejection: none    *On ursodiol for small ducts.     Post-LT Recommended Health Maintenance  Dermatology: due for skin CA screening                                      Colonoscopy: due this year                                          Bone Mineral Density (BMD): 4/2019 -- normal per patient. On Ca/Vit D.               HbA1C: No results found for: HGBA1C    BP today: 154/85   BMI: Body mass index is 32.87 kg/m².   *Walks 5-10 miles per day  Mammogram / pap: up to date        Review of Systems  Constitutional: Negative  for appetite change, chills, fatigue, fever and unexpected weight change.   Eyes: Negative for visual disturbance.   Respiratory: Negative for cough and shortness of breath.    Cardiovascular: Negative for chest pain, palpitations and leg swelling.   Gastrointestinal: Negative for abdominal distention, abdominal pain, blood in stool, constipation, diarrhea, nausea and vomiting. No change in stool color.  Genitourinary: Negative for dysuria and hematuria. Denies dark urine.   Musculoskeletal: Negative for arthralgias, gait problem, joint swelling and myalgias. (+) tight muscles in back, limited ROM to RUE.    Skin: Negative for color change, rash and wound. (+) itching.   Neurological: Negative for dizziness, tremors, speech difficulty, and headaches.   Hematological: Does not bruise/bleed easily.   Psychiatric/Behavioral: Negative for confusion, decreased concentration and sleep disturbance. Denies memory loss. Denies depression. The patient is not nervous/anxious.          Objective:     Physical Exam   Constitutional: Well-nourished. No distress. Alert and oriented to person, place, and time.  Eyes: No scleral icterus.   Cardiovascular: Normal rate, regular rhythm and normal heart sounds. No murmur heard.  Pulmonary/Chest: Respiratory effort and breath sounds normal. No respiratory distress.   Abdominal: Soft, non-tender. No distension; no ascites appreciated. There is no palpable hepatosplenomegaly. No hernia or mass. Well healed chevron scar.   Musculoskeletal: No edema.   Neurological: No tremor. Coordination and gait normal. No asterixis.    Skin: Skin is warm and dry. No rash or erythema. No jaundice. No telangiectasias or palmar erythema noted.  Psychiatric: Normal mood and affect. Speech, behavior, and thought content normal. No depression or anxiety noted.       Lab Results   Component Value Date    BILITOT 0.5 03/30/2009    AST 37 03/30/2009    ALT 84 (H) 03/30/2009    ALKPHOS 91 03/30/2009    CREATININE  1.3 03/30/2009    ALBUMIN 4.4 03/30/2009     Lab Results   Component Value Date    WBC 4.2 04/01/2019    HGB 12.0 04/01/2019    HCT 39 04/01/2019     04/01/2019     Lab Results   Component Value Date    TACROLIMUS 8.1 03/30/2009       Assessment/Plan:   1. S/P liver transplant due to: cirrhosis 2/2 autoimmune hepatitis  -- Normal LFTs. Continue monitoring symptoms, labs, and drug levels for drug-related toxicity and side effects.  -- Continue with post transplant labs per protocol    2. Prophylactic immunotherapy  -- Current IS:                                Tacrolimus: 2 mg / 2 mg. Last prograf level 5.2.                      MMF: 500 mg BID  -- Chronic immunosuppressive medications for rejection prophylaxis already reviewed by staff, noted to be stable. No changes recommended at this time.     3. Health maintenance  -- DXA: up to date, continue Ca/Vit D  -- Colonoscopy: due now  -- Skin CA screening: due now    4. Body mass index is 32.87 kg/m².  -- Discussed dietary changes to assist with weight loss, specifically low carbohydrate        Transplant Health Maintenance:  -- Instructed to f/u with PCP for regular health maintenance care including cancer (colonoscopy) and bone mineral density screenings   -- Reviewed need to avoid sun exposure with use of sunblock, hats, long sleeves related to increased risk of skin cancers. Recommend f/u with Dermatology for annual skin checks  -- Females: Follow-up with PCP/GYN for routine mammograms and pap smears         Return to clinic in 1 year      Mary Beth Juarez NP    Hepatology and Liver Transplant       UNOS Patient Status  Functional Status: 100% - Normal, no complaints, no evidence of disease  Physical Capacity: No Limitations

## 2019-10-31 DIAGNOSIS — Z94.4 STATUS POST LIVER TRANSPLANT: ICD-10-CM

## 2019-10-31 RX ORDER — MYCOPHENOLATE MOFETIL 250 MG/1
CAPSULE ORAL
Qty: 120 CAPSULE | Refills: 0 | Status: SHIPPED | OUTPATIENT
Start: 2019-10-31 | End: 2019-11-06 | Stop reason: SDUPTHER

## 2019-11-06 DIAGNOSIS — Z94.4 STATUS POST LIVER TRANSPLANT: ICD-10-CM

## 2019-11-07 RX ORDER — MYCOPHENOLATE MOFETIL 250 MG/1
500 CAPSULE ORAL 2 TIMES DAILY
Qty: 120 CAPSULE | Refills: 11 | Status: SHIPPED | OUTPATIENT
Start: 2019-11-07 | End: 2020-01-24 | Stop reason: SDUPTHER

## 2020-01-14 LAB
EXT ALBUMIN: 4.1
EXT ALKALINE PHOSPHATASE: 83
EXT ALT: 28
EXT AST: 38
EXT BASOPHIL%: 0.6
EXT BILIRUBIN TOTAL: 0.45
EXT BUN: 21
EXT CALCIUM: 9.6
EXT CHLORIDE: 103
EXT CO2: 23
EXT CREATININE: 1.18 MG/DL
EXT EOSINOPHIL%: 3.8
EXT GLUCOSE: 103
EXT HEMATOCRIT: 36.2
EXT HEMOGLOBIN: 11.4
EXT LYMPH%: 31.4
EXT MONOCYTES%: 7.4
EXT PLATELETS: 209
EXT POTASSIUM: 4.2
EXT PROTEIN TOTAL: 7.8
EXT SEGS%: 56.4
EXT SODIUM: 140 MMOL/L
EXT TACROLIMUS LVL: 5.2
EXT WBC: 4.7

## 2020-01-17 ENCOUNTER — TELEPHONE (OUTPATIENT)
Dept: TRANSPLANT | Facility: CLINIC | Age: 65
End: 2020-01-17

## 2020-01-17 NOTE — LETTER
January 17, 2020    Aletha Hopkins  [address]  [place] [address]          Dear Aletha Hopkins:  MRN: [id number]    This is a follow up to your recent labs, your lab results were stable.  There are no medicine changes.  Please have your labs drawn again on 4/6/2020.      If you cannot have your labs drawn on the scheduled date, it is your responsibility to call the transplant department to reschedule.  To reschedule or make an appointment, please as to speak to or leave a message for my assistant, Meghan Whiting or Shannen, at [phone].  When leaving a message for Meghan Whiting Angela or myself, we ask that you leave a brief message regarding your request.    Sincerely,      Cory Newman RN, BSN  Your Liver Transplant Coordinator    [facility]  [address] [place] [id number]  [phone]

## 2020-01-24 DIAGNOSIS — Z94.4 STATUS POST LIVER TRANSPLANT: ICD-10-CM

## 2020-01-24 DIAGNOSIS — Z94.4 LIVER REPLACED BY TRANSPLANT: ICD-10-CM

## 2020-01-24 RX ORDER — TACROLIMUS 1 MG/1
2 CAPSULE ORAL EVERY 12 HOURS
Qty: 120 CAPSULE | Refills: 11 | Status: SHIPPED | OUTPATIENT
Start: 2020-01-24 | End: 2021-01-28

## 2020-01-24 RX ORDER — MYCOPHENOLATE MOFETIL 250 MG/1
500 CAPSULE ORAL 2 TIMES DAILY
Qty: 120 CAPSULE | Refills: 11 | Status: SHIPPED | OUTPATIENT
Start: 2020-01-24 | End: 2021-01-29

## 2020-01-24 NOTE — TELEPHONE ENCOUNTER
----- Message from Kirti Potts sent at 1/24/2020  2:00 PM CST -----  Contact: pt  Patient calling  regarding prescription, mycophenolate (CELLCEPT) 250 mg Cap, tactacrolimus (PROGRAF) 1 MG Cap to be sent to      [facility] [address] [place] [address] [address] [facility] [address] [facility] [place] [address]  Phone: [phone] Fax: [phone]

## 2020-01-24 NOTE — TELEPHONE ENCOUNTER
----- Message from Kirti Potts sent at 1/24/2020  2:00 PM CST -----  Contact: pt  Patient calling  regarding prescription, mycophenolate (CELLCEPT) 250 mg Cap, tactacrolimus (PROGRAF) 1 MG Cap to be sent to      [facility] [address] [place] [address] 950 [facility] [address] [facility] [place] [address]  Phone: [phone] Fax: [phone]

## 2020-02-24 DIAGNOSIS — Z94.4 LIVER REPLACED BY TRANSPLANT: ICD-10-CM

## 2020-02-24 RX ORDER — URSODIOL 300 MG/1
CAPSULE ORAL
Qty: 60 CAPSULE | Refills: 6 | Status: SHIPPED | OUTPATIENT
Start: 2020-02-24 | End: 2020-08-19

## 2020-07-09 LAB
EXT ALBUMIN: 4.2
EXT ALKALINE PHOSPHATASE: 81
EXT ALT: 16
EXT AST: 23
EXT BASOPHIL%: 0.7
EXT BILIRUBIN TOTAL: 0.36
EXT BUN: 30
EXT CALCIUM: 10.1
EXT CHLORIDE: 102
EXT CO2: 26
EXT CREATININE: 1.43 MG/DL
EXT EOSINOPHIL%: 2.8
EXT GLUCOSE: 96
EXT HEMATOCRIT: 34.9
EXT HEMOGLOBIN: 11.3
EXT LYMPH%: 36.2
EXT MONOCYTES%: 7.6
EXT PLATELETS: 223
EXT POTASSIUM: 4.3
EXT PROTEIN TOTAL: 7.5
EXT SEGS%: 52.5
EXT SODIUM: 137 MMOL/L
EXT TACROLIMUS LVL: 4.7
EXT WBC: 4.6

## 2020-07-10 ENCOUNTER — TELEPHONE (OUTPATIENT)
Dept: TRANSPLANT | Facility: CLINIC | Age: 65
End: 2020-07-10

## 2020-07-10 NOTE — LETTER
July 14, 2020    Aletha Hopkins  [address]  [place] [address]          Dear Aletha Hopkins:  MRN: [id number]    This is a follow up to your recent labs, your lab results were stable.  There are no medicine changes.  Please have your labs drawn again on 9/28/20.      If you cannot have your labs drawn on the scheduled date, it is your responsibility to call the transplant department to reschedule.  Please call [phone] and ask to speak to [facility] for all scheduling requests.     Sincerely,      Cory Newman RN, BSN  Your Liver Transplant Coordinator    [facility] [place]  [facility] [place] [address]  [phone]

## 2020-07-10 NOTE — TELEPHONE ENCOUNTER
Stable labs, no medication changes.  Next labs 9/28/20, letter sent.  ----- Message from Bryson Brand MD sent at 7/10/2020  1:29 PM CDT -----  Results reviewed

## 2020-09-30 LAB
EXT ALBUMIN: 4.1
EXT ALKALINE PHOSPHATASE: 99
EXT ALT: 22
EXT AST: 26
EXT BASOPHIL%: 0.9
EXT BILIRUBIN TOTAL: 0.27
EXT BUN: 25
EXT CALCIUM: 9.6
EXT CHLORIDE: 102
EXT CO2: 27
EXT CREATININE: 1.83 MG/DL
EXT EOSINOPHIL%: 2
EXT GLUCOSE: 106
EXT HEMATOCRIT: 35.6
EXT HEMOGLOBIN: 11.2
EXT LYMPH%: 37
EXT MONOCYTES%: 9.4
EXT PLATELETS: 228
EXT POTASSIUM: 4.7
EXT PROTEIN TOTAL: 7.6
EXT SEGS%: 50.5
EXT SODIUM: 139 MMOL/L
EXT TACROLIMUS LVL: 5.4
EXT WBC: 4.6

## 2020-10-01 ENCOUNTER — TELEPHONE (OUTPATIENT)
Dept: TRANSPLANT | Facility: CLINIC | Age: 65
End: 2020-10-01

## 2020-10-01 NOTE — TELEPHONE ENCOUNTER
Called pt to discuss.  Instructed to increase fluid intake and repeat labs next week.  ----- Message from Bryson Brand MD sent at 10/1/2020 11:19 AM CDT -----  Results reviewed  Oral hydration and repeat next week

## 2020-10-07 ENCOUNTER — TELEPHONE (OUTPATIENT)
Dept: TRANSPLANT | Facility: CLINIC | Age: 65
End: 2020-10-07

## 2020-10-09 LAB
EXT ALBUMIN: 4.3
EXT ALKALINE PHOSPHATASE: 95
EXT ALT: 27
EXT AST: 30
EXT BASOPHIL%: 0.4
EXT BILIRUBIN TOTAL: 0.31
EXT BUN: 25
EXT CALCIUM: 10.3
EXT CHLORIDE: 103
EXT CO2: 24
EXT CREATININE: 1.33 MG/DL
EXT EOSINOPHIL%: 2.7
EXT GLUCOSE: 108
EXT HEMATOCRIT: 35.3
EXT HEMOGLOBIN: 11.2
EXT LYMPH%: 41.1
EXT MONOCYTES%: 7
EXT PLATELETS: 225
EXT POTASSIUM: 4.5
EXT PROTEIN TOTAL: 8.1
EXT SEGS%: 48.6
EXT SODIUM: 139 MMOL/L
EXT TACROLIMUS LVL: 4
EXT WBC: 5.3

## 2020-10-16 ENCOUNTER — TELEPHONE (OUTPATIENT)
Dept: TRANSPLANT | Facility: CLINIC | Age: 65
End: 2020-10-16

## 2020-10-16 NOTE — TELEPHONE ENCOUNTER
Stable labs, no medication changes.  Next labs 1/11/21, letter sent.  ----- Message from Bryson Brand MD sent at 10/15/2020  3:33 PM CDT -----  Results reviewed

## 2020-10-16 NOTE — LETTER
October 16, 2020    Aletha Hopkins  [address]  [place] [address]          Dear Aletha Hopkins:  MRN: [id number]    This is a follow up to your recent labs, your lab results were stable.  There are no medicine changes.  Please have your labs drawn again on 1/11/21.      If you cannot have your labs drawn on the scheduled date, it is your responsibility to call the transplant department to reschedule.  Please call [phone] and ask to speak to Shannen JOHNSON   for all scheduling requests.     Sincerely,      Cory Newman RN, BSN  Your Liver Transplant Coordinator    [facility] [place]  [facility] [place] [address]  [phone]

## 2020-10-26 ENCOUNTER — OFFICE VISIT (OUTPATIENT)
Dept: TRANSPLANT | Facility: CLINIC | Age: 65
End: 2020-10-26
Payer: COMMERCIAL

## 2020-10-26 VITALS
RESPIRATION RATE: 16 BRPM | HEART RATE: 80 BPM | WEIGHT: 186.75 LBS | OXYGEN SATURATION: 100 % | SYSTOLIC BLOOD PRESSURE: 158 MMHG | BODY MASS INDEX: 29.03 KG/M2 | DIASTOLIC BLOOD PRESSURE: 73 MMHG | TEMPERATURE: 98 F

## 2020-10-26 DIAGNOSIS — Z94.4 LIVER TRANSPLANTED: Primary | ICD-10-CM

## 2020-10-26 DIAGNOSIS — Z29.89 PROPHYLACTIC IMMUNOTHERAPY: ICD-10-CM

## 2020-10-26 PROCEDURE — 99214 OFFICE O/P EST MOD 30 MIN: CPT | Mod: S$GLB,,, | Performed by: INTERNAL MEDICINE

## 2020-10-26 PROCEDURE — 99999 PR PBB SHADOW E&M-EST. PATIENT-LVL III: CPT | Mod: PBBFAC,,, | Performed by: INTERNAL MEDICINE

## 2020-10-26 PROCEDURE — 3008F PR BODY MASS INDEX (BMI) DOCUMENTED: ICD-10-PCS | Mod: S$GLB,,, | Performed by: INTERNAL MEDICINE

## 2020-10-26 PROCEDURE — 3008F BODY MASS INDEX DOCD: CPT | Mod: S$GLB,,, | Performed by: INTERNAL MEDICINE

## 2020-10-26 PROCEDURE — 99999 PR PBB SHADOW E&M-EST. PATIENT-LVL III: ICD-10-PCS | Mod: PBBFAC,,, | Performed by: INTERNAL MEDICINE

## 2020-10-26 PROCEDURE — 99214 PR OFFICE/OUTPT VISIT, EST, LEVL IV, 30-39 MIN: ICD-10-PCS | Mod: S$GLB,,, | Performed by: INTERNAL MEDICINE

## 2020-10-26 NOTE — PROGRESS NOTES
Subjective:       Patient ID: Aletha Hopkins is a 65 y.o. female.    Chief Complaint: Liver Transplant Follow-up    HPI   I saw this 65 year old lady in the liver transplant follow up clinic. 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 She is now almost 12 years post OLT.    ORGAN: LIVER  Whole or Partial: whole liver  Donor Type: donation after brain death  CDC High Risk: no  Donor CMV Status: Positive  Donor HCV Status: Negative  Donor HBcAb: Negative  Donor HBV TICO:   Donor HCV TICO:   Biliary Anastomosis:   Arterial Anatomy:   IVC reconstruction:   Portal vein status:      She has done well with no complications since then other than mild renal impairment which has improved with a decrease in the dose of her tacrolimus.    She remains on tac 2 mg BID and MMF 500mg BID    Health Maintenance  -Bone mineral density - last BMD ~ May 2020  -Colonoscopy- reportedly normal ~ 2020  -Dermatology- educated on skin care     Flu shot done    Body mass index is 29.03 kg/m².      Lab Results   Component Value Date    ALT 84 (H) 2009     Past Medical History:   Diagnosis Date    Liver transplanted 2009    Autoimmune     No past surgical history on file.  Current Outpatient Medications   Medication Sig    CALCIUM CARBONATE/VITAMIN D3 (CALCIUM WITH VITAMIN D3 ORAL) Take 1 capsule by mouth 2 (two) times daily. Bone strength calcium, magnesium, vit D3 and vit K    fish oil-omega-3 fatty acids 300-1,000 mg capsule Take 1 capsule by mouth once daily.    multivitamin (THERAGRAN) per tablet Take 1 tablet by mouth. 1 Tablet Oral Every day.  over the counter    mycophenolate (CELLCEPT) 250 mg Cap Take 2 capsules (500 mg total) by mouth 2 (two) times daily.    tacrolimus (PROGRAF) 1 MG Cap Take 2 capsules (2 mg total) by mouth every 12 (twelve) hours.    TURMERIC ORAL Take 1 capsule by mouth once daily. Turmeric with zayda    ursodioL (ACTIGALL) 300 mg capsule TAKE 1  CAPSULE BY MOUTH TWICE DAILY     No current facility-administered medications for this visit.        Review of Systems   Constitutional: Negative for activity change, appetite change, chills, fatigue, fever and unexpected weight change.   HENT: Negative for ear pain, hearing loss, nosebleeds, sore throat and trouble swallowing.    Eyes: Negative for redness and visual disturbance.   Respiratory: Negative for cough, chest tightness, shortness of breath and wheezing.    Cardiovascular: Negative for chest pain and palpitations.   Gastrointestinal: Negative for abdominal distention, abdominal pain, blood in stool, constipation, diarrhea, nausea and vomiting.   Genitourinary: Negative for difficulty urinating, dysuria, frequency, hematuria and urgency.   Musculoskeletal: Negative for arthralgias, back pain, gait problem, joint swelling and myalgias.   Skin: Negative for rash.   Neurological: Negative for tremors, seizures, speech difficulty, weakness and headaches.   Hematological: Negative for adenopathy.   Psychiatric/Behavioral: Negative for confusion, decreased concentration and sleep disturbance. The patient is not nervous/anxious.          Objective:      Physical Exam   Constitutional: She is oriented to person, place, and time. She appears well-developed and well-nourished.   HENT:   Head: Normocephalic and atraumatic.   Mouth/Throat: Oropharynx is clear and moist.   Eyes: Pupils are equal, round, and reactive to light. Conjunctivae and EOM are normal. No scleral icterus.   Neck: Normal range of motion. Neck supple.   Cardiovascular: Normal rate, regular rhythm and normal heart sounds.   Pulmonary/Chest: Effort normal and breath sounds normal.   Abdominal: Soft. Bowel sounds are normal. She exhibits no distension. There is no abdominal tenderness.   Musculoskeletal: Normal range of motion.         General: No edema.   Neurological: She is alert and oriented to person, place, and time.   Psychiatric: She has a  normal mood and affect. Her behavior is normal.   Vitals reviewed.      Assessment:       1. Liver transplanted for AIH    2. Prophylactic immunotherapy        Plan:   1. No change in immunosuppression continue with Tac and MMF  2. Has managed to lose about 30 lb in weight  2. PCP and Gyne doc locally- will follow with maintenance tests  3. Labs every 3 months    Clinic in 1 year.    Functional Status: 100% - Normal, no complaints, no evidence of disease  Physical Capacity: No Limitations

## 2020-10-26 NOTE — Clinical Note
October 26, 2020                     [facility]  [address]  [facility] [address]  Phone: [phone]   Patient: Aletha Hopkins   MR Number: [id number]   YOB: 1955   Date of Visit: 10/26/2020       Dear      Thank you for referring Aletha Hopkins to me for evaluation. Attached you will find relevant portions of my assessment and plan of care.    If you have questions, please do not hesitate to call me. I look forward to following Aletha Hopkins along with you.    Sincerely,    Bryson Brand MD    Enclosure    If you would like to receive this communication electronically, please contact [email or web address] or [phone] to request [employer] Link access.    [employer] Link is a tool which provides read-only access to select patient information with whom you have a relationship. Its easy to use and provides real time access to review your patients record including encounter summaries, notes, results, and demographic information.    If you feel you have received this communication in error or would no longer like to receive these types of communications, please e-mail [email or web address]

## 2021-01-08 ENCOUNTER — PATIENT MESSAGE (OUTPATIENT)
Dept: TRANSPLANT | Facility: CLINIC | Age: 66
End: 2021-01-08

## 2021-01-14 ENCOUNTER — TELEPHONE (OUTPATIENT)
Dept: TRANSPLANT | Facility: CLINIC | Age: 66
End: 2021-01-14

## 2021-01-20 ENCOUNTER — PATIENT MESSAGE (OUTPATIENT)
Dept: TRANSPLANT | Facility: CLINIC | Age: 66
End: 2021-01-20

## 2021-01-20 LAB
EXT ALBUMIN: 4.2
EXT ALKALINE PHOSPHATASE: 91
EXT ALT: 18
EXT AST: 22
EXT BASOPHIL%: 0.9
EXT BILIRUBIN TOTAL: 0.52
EXT BUN: 20
EXT CALCIUM: 10.1
EXT CHLORIDE: 105
EXT CO2: 27
EXT CREATININE: 1.28 MG/DL
EXT EOSINOPHIL%: 2.7
EXT GLUCOSE: 98
EXT HEMATOCRIT: 36.6
EXT HEMOGLOBIN: 11.5
EXT LYMPH%: 38.4
EXT MONOCYTES%: 8.3
EXT PLATELETS: 220
EXT POTASSIUM: 5.1
EXT PROTEIN TOTAL: 7.7
EXT SEGS%: 49.5
EXT SODIUM: 140 MMOL/L
EXT TACROLIMUS LVL: 3.7
EXT WBC: 4.5

## 2021-01-21 ENCOUNTER — TELEPHONE (OUTPATIENT)
Dept: TRANSPLANT | Facility: CLINIC | Age: 66
End: 2021-01-21

## 2021-04-13 ENCOUNTER — PATIENT MESSAGE (OUTPATIENT)
Dept: TRANSPLANT | Facility: CLINIC | Age: 66
End: 2021-04-13

## 2021-04-28 ENCOUNTER — TELEPHONE (OUTPATIENT)
Dept: TRANSPLANT | Facility: CLINIC | Age: 66
End: 2021-04-28

## 2021-05-04 LAB
EXT ALBUMIN: 4.5
EXT ALKALINE PHOSPHATASE: 105
EXT ALT: 17
EXT AST: 22
EXT BASOPHIL%: 0.8
EXT BILIRUBIN TOTAL: 0.46
EXT BUN: 17
EXT CALCIUM: 10.1
EXT CHLORIDE: 103
EXT CO2: 26
EXT CREATININE: 1.45 MG/DL
EXT EOSINOPHIL%: 3.1
EXT GLUCOSE: 97
EXT HEMATOCRIT: 37.4
EXT HEMOGLOBIN: 12.4
EXT LYMPH%: 39.5
EXT MONOCYTES%: 9.3
EXT PLATELETS: 235
EXT POTASSIUM: 5.2
EXT PROTEIN TOTAL: 8
EXT SEGS%: 47
EXT SODIUM: 141 MMOL/L
EXT TACROLIMUS LVL: 3.5
EXT WBC: 3.9

## 2021-05-10 DIAGNOSIS — Z94.4 LIVER REPLACED BY TRANSPLANT: ICD-10-CM

## 2021-05-11 RX ORDER — URSODIOL 300 MG/1
CAPSULE ORAL
Qty: 180 CAPSULE | Refills: 6 | Status: SHIPPED | OUTPATIENT
Start: 2021-05-11 | End: 2022-05-30

## 2021-07-01 ENCOUNTER — PATIENT MESSAGE (OUTPATIENT)
Dept: TRANSPLANT | Facility: CLINIC | Age: 66
End: 2021-07-01

## 2021-07-19 ENCOUNTER — TELEPHONE (OUTPATIENT)
Dept: TRANSPLANT | Facility: CLINIC | Age: 66
End: 2021-07-19

## 2021-07-19 ENCOUNTER — PATIENT MESSAGE (OUTPATIENT)
Dept: TRANSPLANT | Facility: CLINIC | Age: 66
End: 2021-07-19

## 2021-07-20 LAB
EXT ALBUMIN: 4.2
EXT ALKALINE PHOSPHATASE: 109
EXT ALT: 18
EXT AST: 23
EXT BASOPHIL%: 1
EXT BILIRUBIN TOTAL: 0.44
EXT BUN: 25
EXT CALCIUM: 9.8
EXT CHLORIDE: 102
EXT CO2: 26
EXT CREATININE: 1.41 MG/DL
EXT EOSINOPHIL%: 3.4
EXT GLUCOSE: 101
EXT HEMATOCRIT: 39
EXT HEMOGLOBIN: 12.4
EXT LYMPH%: 41
EXT MONOCYTES%: 7.9
EXT PLATELETS: 223
EXT POTASSIUM: 4.7
EXT PROTEIN TOTAL: 7.6
EXT SEGS%: 46.5
EXT SODIUM: 139 MMOL/L
EXT TACROLIMUS LVL: 7.5
EXT WBC: 5.1

## 2021-07-28 ENCOUNTER — TELEPHONE (OUTPATIENT)
Dept: TRANSPLANT | Facility: CLINIC | Age: 66
End: 2021-07-28

## 2021-08-03 ENCOUNTER — TELEPHONE (OUTPATIENT)
Dept: TRANSPLANT | Facility: CLINIC | Age: 66
End: 2021-08-03

## 2021-10-29 ENCOUNTER — PATIENT MESSAGE (OUTPATIENT)
Dept: TRANSPLANT | Facility: CLINIC | Age: 66
End: 2021-10-29
Payer: COMMERCIAL

## 2021-10-29 LAB
EXT ALBUMIN: 4.4
EXT ALKALINE PHOSPHATASE: 113
EXT ALT: 21
EXT AST: 22
EXT BASOPHIL%: 0.9
EXT BILIRUBIN TOTAL: 0.5
EXT BUN: 22
EXT CALCIUM: 10
EXT CHLORIDE: 102
EXT CO2: 26
EXT CREATININE: 1.36 MG/DL
EXT EOSINOPHIL%: 3.5
EXT GLUCOSE: 91
EXT HEMATOCRIT: 38.1
EXT HEMOGLOBIN: 12.4
EXT LYMPH%: 37.3
EXT MONOCYTES%: 9.8
EXT PLATELETS: 240
EXT POTASSIUM: 4.6
EXT PROTEIN TOTAL: 7.5
EXT SEGS%: 48.3
EXT SODIUM: 139 MMOL/L
EXT TACROLIMUS LVL: 3.5
EXT WBC: 4.5

## 2021-11-04 ENCOUNTER — TELEPHONE (OUTPATIENT)
Dept: TRANSPLANT | Facility: CLINIC | Age: 66
End: 2021-11-04
Payer: COMMERCIAL

## 2021-11-16 ENCOUNTER — OFFICE VISIT (OUTPATIENT)
Dept: TRANSPLANT | Facility: CLINIC | Age: 66
End: 2021-11-16
Payer: COMMERCIAL

## 2021-11-16 VITALS
SYSTOLIC BLOOD PRESSURE: 141 MMHG | OXYGEN SATURATION: 97 % | HEART RATE: 82 BPM | HEIGHT: 67 IN | DIASTOLIC BLOOD PRESSURE: 76 MMHG | BODY MASS INDEX: 31.21 KG/M2 | WEIGHT: 198.88 LBS | TEMPERATURE: 98 F | RESPIRATION RATE: 16 BRPM

## 2021-11-16 DIAGNOSIS — Z94.4 LIVER TRANSPLANTED: Primary | ICD-10-CM

## 2021-11-16 PROCEDURE — 3077F SYST BP >= 140 MM HG: CPT | Mod: S$GLB,,, | Performed by: INTERNAL MEDICINE

## 2021-11-16 PROCEDURE — 99214 PR OFFICE/OUTPT VISIT, EST, LEVL IV, 30-39 MIN: ICD-10-PCS | Mod: S$GLB,,, | Performed by: INTERNAL MEDICINE

## 2021-11-16 PROCEDURE — 3008F PR BODY MASS INDEX (BMI) DOCUMENTED: ICD-10-PCS | Mod: S$GLB,,, | Performed by: INTERNAL MEDICINE

## 2021-11-16 PROCEDURE — 1159F MED LIST DOCD IN RCRD: CPT | Mod: S$GLB,,, | Performed by: INTERNAL MEDICINE

## 2021-11-16 PROCEDURE — 99999 PR PBB SHADOW E&M-EST. PATIENT-LVL III: CPT | Mod: PBBFAC,,, | Performed by: INTERNAL MEDICINE

## 2021-11-16 PROCEDURE — 99999 PR PBB SHADOW E&M-EST. PATIENT-LVL III: ICD-10-PCS | Mod: PBBFAC,,, | Performed by: INTERNAL MEDICINE

## 2021-11-16 PROCEDURE — 1159F PR MEDICATION LIST DOCUMENTED IN MEDICAL RECORD: ICD-10-PCS | Mod: S$GLB,,, | Performed by: INTERNAL MEDICINE

## 2021-11-16 PROCEDURE — 3078F DIAST BP <80 MM HG: CPT | Mod: S$GLB,,, | Performed by: INTERNAL MEDICINE

## 2021-11-16 PROCEDURE — 3077F PR MOST RECENT SYSTOLIC BLOOD PRESSURE >= 140 MM HG: ICD-10-PCS | Mod: S$GLB,,, | Performed by: INTERNAL MEDICINE

## 2021-11-16 PROCEDURE — 3078F PR MOST RECENT DIASTOLIC BLOOD PRESSURE < 80 MM HG: ICD-10-PCS | Mod: S$GLB,,, | Performed by: INTERNAL MEDICINE

## 2021-11-16 PROCEDURE — 3008F BODY MASS INDEX DOCD: CPT | Mod: S$GLB,,, | Performed by: INTERNAL MEDICINE

## 2021-11-16 PROCEDURE — 99214 OFFICE O/P EST MOD 30 MIN: CPT | Mod: S$GLB,,, | Performed by: INTERNAL MEDICINE

## 2022-02-03 LAB
EXT ALBUMIN: 4.4
EXT ALKALINE PHOSPHATASE: 106
EXT ALT: 22
EXT AST: 33
EXT BASOPHIL%: 0.7
EXT BILIRUBIN TOTAL: 0.31
EXT BUN: 22
EXT CALCIUM: 10
EXT CHLORIDE: 104
EXT CO2: 30
EXT CREATININE: 1.31 MG/DL
EXT EOSINOPHIL%: 3.3
EXT GFR MDRD NON AF AMER: 50
EXT GLUCOSE: 96
EXT HEMATOCRIT: 38.5
EXT HEMOGLOBIN: 11.9
EXT LYMPH%: 40.7
EXT MONOCYTES%: 7.2
EXT PLATELETS: 229
EXT POTASSIUM: 5
EXT PROTEIN TOTAL: 7.1
EXT SEGS%: 47.9
EXT SODIUM: 141 MMOL/L
EXT TACROLIMUS LVL: 3.7
EXT WBC: 4.6

## 2022-02-08 ENCOUNTER — TELEPHONE (OUTPATIENT)
Dept: TRANSPLANT | Facility: CLINIC | Age: 67
End: 2022-02-08
Payer: COMMERCIAL

## 2022-02-08 NOTE — TELEPHONE ENCOUNTER
Labs reviewed. No changes. Message sent ----- Message from Bryson Brand MD sent at 2/4/2022 11:03 AM CST -----  Results reviewed

## 2022-05-06 LAB
EXT ALBUMIN: 4.3
EXT ALKALINE PHOSPHATASE: 102
EXT ALT: 22
EXT AST: 19
EXT BASOPHIL%: 0.9
EXT BILIRUBIN TOTAL: 0.37
EXT BUN: 26.1
EXT CALCIUM: 9.9
EXT CHLORIDE: 105
EXT CO2: 27
EXT CREATININE: 1.32 MG/DL
EXT EOSINOPHIL%: 3.2
EXT GFR MDRD NON AF AMER: 45
EXT GLUCOSE: 100
EXT HEMATOCRIT: 38.5
EXT HEMOGLOBIN: 11.8
EXT LYMPH%: 33.9
EXT MONOCYTES%: 8.8
EXT PLATELETS: 233
EXT POTASSIUM: 5.3
EXT PROTEIN TOTAL: 7
EXT SEGS%: 53
EXT SODIUM: 140 MMOL/L
EXT TACROLIMUS LVL: 2.4
EXT WBC: 4.4

## 2022-05-11 ENCOUNTER — PATIENT MESSAGE (OUTPATIENT)
Dept: RESEARCH | Facility: CLINIC | Age: 67
End: 2022-05-11
Payer: COMMERCIAL

## 2022-05-13 ENCOUNTER — TELEPHONE (OUTPATIENT)
Dept: TRANSPLANT | Facility: CLINIC | Age: 67
End: 2022-05-13
Payer: COMMERCIAL

## 2022-05-13 ENCOUNTER — PATIENT MESSAGE (OUTPATIENT)
Dept: TRANSPLANT | Facility: CLINIC | Age: 67
End: 2022-05-13
Payer: COMMERCIAL

## 2022-05-13 NOTE — TELEPHONE ENCOUNTER
Sent patient message via portal to let her know labs are stable.  No medication changes, next labs due on 8/01/22      ----- Message from Bryson Brand MD sent at 5/11/2022  3:42 PM CDT -----  Results reviewed

## 2022-07-15 ENCOUNTER — PATIENT MESSAGE (OUTPATIENT)
Dept: TRANSPLANT | Facility: CLINIC | Age: 67
End: 2022-07-15
Payer: COMMERCIAL

## 2022-07-15 ENCOUNTER — TELEPHONE (OUTPATIENT)
Dept: TRANSPLANT | Facility: CLINIC | Age: 67
End: 2022-07-15
Payer: COMMERCIAL

## 2022-07-15 NOTE — TELEPHONE ENCOUNTER
----- Message from Jared Vazquez sent at 7/15/2022 12:11 PM CDT -----  Regarding: Confirmation  Patient called back stating that 8/22 for 3:00pm with Dr Brand will be fine for her.    Contact: [phone]

## 2022-08-10 ENCOUNTER — TELEPHONE (OUTPATIENT)
Dept: TRANSPLANT | Facility: CLINIC | Age: 67
End: 2022-08-10
Payer: COMMERCIAL

## 2022-08-10 NOTE — TELEPHONE ENCOUNTER
----- Message from Laila Foy RN sent at 8/10/2022 11:32 AM CDT -----  Regarding: FW: Orders  Can you fax over the order  ----- Message -----  From: Jared Vazquez  Sent: 8/10/2022  11:23 AM CDT  To: [facility]  Subject: Orders                                           Patient states she is calling to speak to nurse, says she needs order sent over to [facility]. She's currently waiting to have labs done but facility does have any for her.    Contact: [phone]

## 2022-08-11 ENCOUNTER — PATIENT MESSAGE (OUTPATIENT)
Dept: TRANSPLANT | Facility: CLINIC | Age: 67
End: 2022-08-11
Payer: COMMERCIAL

## 2022-08-11 LAB
EXT ALBUMIN: 4.4
EXT ALKALINE PHOSPHATASE: 96
EXT ALT: 21
EXT AST: 22
EXT BASOPHIL%: 0.7
EXT BILIRUBIN TOTAL: 0.41
EXT BUN: 21.2
EXT CALCIUM: 9.9
EXT CHLORIDE: 103
EXT CO2: 27
EXT CREATININE: 1.37 MG/DL
EXT EOSINOPHIL%: 2.2
EXT GFR MDRD NON AF AMER: 43
EXT GLUCOSE: 101
EXT HEMATOCRIT: 37.4
EXT HEMOGLOBIN: 11.6
EXT LYMPH%: 33.7
EXT MONOCYTES%: 7.4
EXT PLATELETS: 230
EXT POTASSIUM: 5
EXT PROTEIN TOTAL: 7.5
EXT SEGS%: 55.8
EXT SODIUM: 138 MMOL/L
EXT TACROLIMUS LVL: 2.8
EXT WBC: 4.2

## 2022-08-12 ENCOUNTER — PATIENT MESSAGE (OUTPATIENT)
Dept: TRANSPLANT | Facility: CLINIC | Age: 67
End: 2022-08-12
Payer: COMMERCIAL

## 2022-08-12 ENCOUNTER — TELEPHONE (OUTPATIENT)
Dept: TRANSPLANT | Facility: CLINIC | Age: 67
End: 2022-08-12
Payer: COMMERCIAL

## 2022-08-12 NOTE — TELEPHONE ENCOUNTER
Sent patient message via portal to let her know labs are stable.  No medication changes, next labs due on 10/24/22      ----- Message from Bryson Brand MD sent at 8/11/2022  4:03 PM CDT -----  Results reviewed

## 2022-08-22 ENCOUNTER — OFFICE VISIT (OUTPATIENT)
Dept: TRANSPLANT | Facility: CLINIC | Age: 67
End: 2022-08-22
Payer: COMMERCIAL

## 2022-08-22 VITALS
DIASTOLIC BLOOD PRESSURE: 78 MMHG | HEIGHT: 67 IN | BODY MASS INDEX: 31.49 KG/M2 | SYSTOLIC BLOOD PRESSURE: 144 MMHG | WEIGHT: 200.63 LBS | HEART RATE: 84 BPM | TEMPERATURE: 97 F | OXYGEN SATURATION: 98 % | RESPIRATION RATE: 16 BRPM

## 2022-08-22 DIAGNOSIS — Z29.89 PROPHYLACTIC IMMUNOTHERAPY: ICD-10-CM

## 2022-08-22 DIAGNOSIS — Z94.4 LIVER TRANSPLANTED: Primary | ICD-10-CM

## 2022-08-22 PROCEDURE — 1159F PR MEDICATION LIST DOCUMENTED IN MEDICAL RECORD: ICD-10-PCS | Mod: CPTII,S$GLB,, | Performed by: INTERNAL MEDICINE

## 2022-08-22 PROCEDURE — 3078F PR MOST RECENT DIASTOLIC BLOOD PRESSURE < 80 MM HG: ICD-10-PCS | Mod: CPTII,S$GLB,, | Performed by: INTERNAL MEDICINE

## 2022-08-22 PROCEDURE — 3078F DIAST BP <80 MM HG: CPT | Mod: CPTII,S$GLB,, | Performed by: INTERNAL MEDICINE

## 2022-08-22 PROCEDURE — 99999 PR PBB SHADOW E&M-EST. PATIENT-LVL III: ICD-10-PCS | Mod: PBBFAC,,, | Performed by: INTERNAL MEDICINE

## 2022-08-22 PROCEDURE — 1159F MED LIST DOCD IN RCRD: CPT | Mod: CPTII,S$GLB,, | Performed by: INTERNAL MEDICINE

## 2022-08-22 PROCEDURE — 99214 OFFICE O/P EST MOD 30 MIN: CPT | Mod: S$GLB,,, | Performed by: INTERNAL MEDICINE

## 2022-08-22 PROCEDURE — 3077F SYST BP >= 140 MM HG: CPT | Mod: CPTII,S$GLB,, | Performed by: INTERNAL MEDICINE

## 2022-08-22 PROCEDURE — 3008F PR BODY MASS INDEX (BMI) DOCUMENTED: ICD-10-PCS | Mod: CPTII,S$GLB,, | Performed by: INTERNAL MEDICINE

## 2022-08-22 PROCEDURE — 99999 PR PBB SHADOW E&M-EST. PATIENT-LVL III: CPT | Mod: PBBFAC,,, | Performed by: INTERNAL MEDICINE

## 2022-08-22 PROCEDURE — 3008F BODY MASS INDEX DOCD: CPT | Mod: CPTII,S$GLB,, | Performed by: INTERNAL MEDICINE

## 2022-08-22 PROCEDURE — 3077F PR MOST RECENT SYSTOLIC BLOOD PRESSURE >= 140 MM HG: ICD-10-PCS | Mod: CPTII,S$GLB,, | Performed by: INTERNAL MEDICINE

## 2022-08-22 PROCEDURE — 99214 PR OFFICE/OUTPT VISIT, EST, LEVL IV, 30-39 MIN: ICD-10-PCS | Mod: S$GLB,,, | Performed by: INTERNAL MEDICINE

## 2022-08-22 NOTE — PROGRESS NOTES
Subjective:       Patient ID: Aletha Hopknis is a 66 y.o. female.    Chief Complaint: Liver Transplant Follow-up    HPI   I saw this 66 year old lady in the liver transplant follow up clinic. 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 She is now 13 years and 7 months post OLT.    ORGAN: LIVER  Whole or Partial: whole liver  Donor Type: donation after brain death  CDC High Risk: no  Donor CMV Status: Positive  Donor HCV Status: Negative  Donor HBcAb: Negative  Donor HBV TICO:   Donor HCV TICO:   Biliary Anastomosis:   Arterial Anatomy:   IVC reconstruction:   Portal vein status:      She has done well with no complications since then other than mild renal impairment which has improved with a decrease in the dose of her tacrolimus.    She remains on tac 2 mg BID and MMF 500mg BID    Health Maintenance  -Bone mineral density - last BMD ~ May 2020  -Colonoscopy- reportedly normal ~ 2020  -Dermatology- educated on skin care     Covid vaccination x2- Moderna    Body mass index is 31.42 kg/m².      Lab Results   Component Value Date    ALT 84 (H) 2009     Past Medical History:   Diagnosis Date    Liver transplanted 2009    Autoimmune     No past surgical history on file.  Current Outpatient Medications   Medication Sig    CALCIUM CARBONATE/VITAMIN D3 (CALCIUM WITH VITAMIN D3 ORAL) Take 1 capsule by mouth 2 (two) times daily. Bone strength calcium, magnesium, vit D3 and vit K    fish oil-omega-3 fatty acids 300-1,000 mg capsule Take 1 capsule by mouth once daily.    multivitamin (THERAGRAN) per tablet Take 1 tablet by mouth. 1 Tablet Oral Every day.  over the counter    mycophenolate (CELLCEPT) 250 mg Cap TAKE 2 CAPSULES BY MOUTH EVERY 12 HOURS    tacrolimus (PROGRAF) 1 MG Cap TAKE 2 CAPSULE BY MOUTH EVERY 12 HOURS    ursodioL (ACTIGALL) 300 mg capsule TAKE 1 CAPSULE BY MOUTH TWICE DAILY    TURMERIC ORAL Take 1 capsule by mouth once daily. Turmeric with  zayda     No current facility-administered medications for this visit.       Review of Systems   Constitutional: Negative for activity change, appetite change, chills, fatigue, fever and unexpected weight change.   HENT: Negative for ear pain, hearing loss, nosebleeds, sore throat and trouble swallowing.    Eyes: Negative for redness and visual disturbance.   Respiratory: Negative for cough, chest tightness, shortness of breath and wheezing.    Cardiovascular: Negative for chest pain and palpitations.   Gastrointestinal: Negative for abdominal distention, abdominal pain, blood in stool, constipation, diarrhea, nausea and vomiting.   Genitourinary: Negative for difficulty urinating, dysuria, frequency, hematuria and urgency.   Musculoskeletal: Negative for arthralgias, back pain, gait problem, joint swelling and myalgias.   Skin: Negative for rash.   Neurological: Negative for tremors, seizures, speech difficulty, weakness and headaches.   Hematological: Negative for adenopathy.   Psychiatric/Behavioral: Negative for confusion, decreased concentration and sleep disturbance. The patient is not nervous/anxious.          Objective:      Physical Exam  Vitals reviewed.   Constitutional:       Appearance: She is well-developed.   HENT:      Head: Normocephalic and atraumatic.   Eyes:      General: No scleral icterus.     Conjunctiva/sclera: Conjunctivae normal.      Pupils: Pupils are equal, round, and reactive to light.   Cardiovascular:      Rate and Rhythm: Normal rate and regular rhythm.      Heart sounds: Normal heart sounds.   Pulmonary:      Effort: Pulmonary effort is normal.      Breath sounds: Normal breath sounds.   Abdominal:      General: Bowel sounds are normal. There is no distension.      Palpations: Abdomen is soft.      Tenderness: There is no abdominal tenderness.   Musculoskeletal:         General: Normal range of motion.      Cervical back: Normal range of motion and neck supple.   Neurological:       Mental Status: She is alert and oriented to person, place, and time.   Psychiatric:         Behavior: Behavior normal.         Assessment:       1. Liver transplanted for AIH    2. Prophylactic immunotherapy        Plan:   1. No change in immunosuppression continue with Tac and MMF  2. PCP and Gyne doc locally- will follow with maintenance tests  3. Labs every 3 months    Clinic in 1 year.    Functional Status: 100% - Normal, no complaints, no evidence of disease  Physical Capacity: No Limitations

## 2022-08-22 NOTE — Clinical Note
August 22, 2022                     [facility] 1st [facility]  [address]  [facility] [address]  Phone: [phone]   Patient: Aletha Hopkins   MR Number: [id number]   YOB: 1955   Date of Visit: 8/22/2022       Dear      Thank you for referring Aletha Hopkins to me for evaluation. Attached you will find relevant portions of my assessment and plan of care.    If you have questions, please do not hesitate to call me. I look forward to following Aletha Hopkins along with you.    Sincerely,    Bryson Brand MD    Enclosure    If you would like to receive this communication electronically, please contact [email or web address] or [phone] to request [employer] Link access.    [employer] Link is a tool which provides read-only access to select patient information with whom you have a relationship. Its easy to use and provides real time access to review your patients record including encounter summaries, notes, results, and demographic information.    If you feel you have received this communication in error or would no longer like to receive these types of communications, please e-mail [email or web address]

## 2022-10-27 ENCOUNTER — PATIENT MESSAGE (OUTPATIENT)
Dept: TRANSPLANT | Facility: CLINIC | Age: 67
End: 2022-10-27
Payer: COMMERCIAL

## 2022-10-31 LAB
EXT ALBUMIN: 4.1
EXT ALKALINE PHOSPHATASE: 96
EXT ALT: 23
EXT AST: 23
EXT BASOPHIL%: 0.9
EXT BILIRUBIN TOTAL: 0.42
EXT BUN: 16.4
EXT CALCIUM: 10.2
EXT CHLORIDE: 102
EXT CO2: 28
EXT CREATININE: 1.25 MG/DL
EXT EOSINOPHIL%: 2.6
EXT GFR MDRD AF AMER: 48
EXT GLUCOSE: 97
EXT HEMATOCRIT: 38.8
EXT HEMOGLOBIN: 12.2
EXT LYMPH%: 33.6
EXT MONOCYTES%: 8.2
EXT PLATELETS: 209
EXT POTASSIUM: 4.8
EXT PROTEIN TOTAL: 7.4
EXT SEGS%: 54.5
EXT SODIUM: 139 MMOL/L
EXT TACROLIMUS LVL: 2.5
EXT WBC: 4.6

## 2022-11-02 ENCOUNTER — TELEPHONE (OUTPATIENT)
Dept: TRANSPLANT | Facility: CLINIC | Age: 67
End: 2022-11-02
Payer: COMMERCIAL

## 2022-11-02 ENCOUNTER — PATIENT MESSAGE (OUTPATIENT)
Dept: TRANSPLANT | Facility: CLINIC | Age: 67
End: 2022-11-02
Payer: COMMERCIAL

## 2022-11-02 NOTE — TELEPHONE ENCOUNTER
Sent patient message via portal to let her know labs are stable.  No medication changes, next labs due on 1/23/23      ----- Message from Bryson Brand MD sent at 11/1/2022  3:30 PM CDT -----  Results reviewed

## 2022-11-03 ENCOUNTER — TELEPHONE (OUTPATIENT)
Dept: TRANSPLANT | Facility: CLINIC | Age: 67
End: 2022-11-03
Payer: COMMERCIAL

## 2023-01-31 ENCOUNTER — TELEPHONE (OUTPATIENT)
Dept: TRANSPLANT | Facility: CLINIC | Age: 68
End: 2023-01-31
Payer: COMMERCIAL

## 2023-01-31 ENCOUNTER — PATIENT MESSAGE (OUTPATIENT)
Dept: TRANSPLANT | Facility: CLINIC | Age: 68
End: 2023-01-31
Payer: COMMERCIAL

## 2023-01-31 LAB
EXT ALBUMIN: 4.3
EXT ALKALINE PHOSPHATASE: 87
EXT ALT: 24
EXT AST: 30
EXT BASOPHIL%: 1
EXT BILIRUBIN TOTAL: 0.4
EXT BUN: 19.8
EXT CALCIUM: 9.8
EXT CHLORIDE: 104
EXT CO2: 24
EXT CREATININE: 1.26 MG/DL
EXT EOSINOPHIL%: 2.8
EXT GFR MDRD AF AMER: 47
EXT GLUCOSE: 103
EXT HEMATOCRIT: 41.6
EXT HEMOGLOBIN: 12.5
EXT LYMPH%: 37.7
EXT MONOCYTES%: 9.2
EXT PLATELETS: 210
EXT POTASSIUM: 5
EXT PROTEIN TOTAL: 7.3
EXT SEGS%: 49
EXT SODIUM: 140 MMOL/L
EXT TACROLIMUS LVL: 2.8
EXT WBC: 3.9

## 2023-01-31 NOTE — TELEPHONE ENCOUNTER
Sent patient message via portal to let her know labs are stable.  No medication changes, next labs due on 05/22/23      ----- Message from Bryson Brand MD sent at 1/31/2023  4:13 PM CST -----  Results reviewed

## 2023-05-04 NOTE — Clinical Note
August 20, 2018                     [facility]  [address]  [facility] [address]  Phone: [phone]   Patient: Aletha Hopkins   MR Number: [id number]   YOB: 1955   Date of Visit: 8/20/2018       Dear      Thank you for referring Aletha Hopkins to me for evaluation. Attached you will find relevant portions of my assessment and plan of care.    If you have questions, please do not hesitate to call me. I look forward to following Aletha Hopkins along with you.    Sincerely,    Bryson Brand MD    Enclosure    If you would like to receive this communication electronically, please contact [email or web address] or [phone] to request [employer] Link access.    [employer] Link is a tool which provides read-only access to select patient information with whom you have a relationship. Its easy to use and provides real time access to review your patients record including encounter summaries, notes, results, and demographic information.    If you feel you have received this communication in error or would no longer like to receive these types of communications, please e-mail [email or web address]     
[Follow-Up Visit] : a follow-up pain management visit

## 2023-05-24 LAB
EXT ALBUMIN: 4.1
EXT ALKALINE PHOSPHATASE: 88
EXT ALT: 22
EXT AST: 22
EXT BASOPHIL%: 0.5
EXT BILIRUBIN TOTAL: 0.28
EXT BUN: 17.2
EXT CALCIUM: 9.3
EXT CHLORIDE: 103
EXT CO2: 25
EXT CREATININE: 1.18 MG/DL
EXT EOSINOPHIL%: 2.2
EXT GFR MDRD AF AMER: 51
EXT GLUCOSE: 94
EXT HEMATOCRIT: 36.8
EXT HEMOGLOBIN: 11.6
EXT LYMPH%: 33.6
EXT MONOCYTES%: 7
EXT PLATELETS: 238
EXT POTASSIUM: 4.7
EXT PROTEIN TOTAL: 7
EXT SEGS%: 56.4
EXT SODIUM: 136 MMOL/L
EXT TACROLIMUS LVL: 4
EXT WBC: 5.9

## 2023-05-31 ENCOUNTER — PATIENT MESSAGE (OUTPATIENT)
Dept: TRANSPLANT | Facility: CLINIC | Age: 68
End: 2023-05-31
Payer: COMMERCIAL

## 2023-05-31 ENCOUNTER — TELEPHONE (OUTPATIENT)
Dept: TRANSPLANT | Facility: CLINIC | Age: 68
End: 2023-05-31
Payer: COMMERCIAL

## 2023-05-31 NOTE — LETTER
"STANDING LAB ORDERS    2023      ORDERING MD:      Bryson Brand, [facility], FRCP (Shahab), MPH  Section Head  Hepatology and Transplant Hepatology          Patient Name: Aletha Hopkins               : 1955    [facility] Number: [id number]                  #:        Please draw the following labs:     Test Frequency  Diagnosis/ICD-10 Code     CBC w/auto diff/plts every 3 months  Z94.4 Liver Transplant       Comprehensive Metabolic Panel every 3 months  Z94.4 Liver Transplant         Prograf level every 3 months  Z94.4 Liver Transplant         FAX RESULTS [phone] "Attention Liver Transplant Coordinator", and send the hard copy when completed. If you have any questions regarding this request or need additional information, please call [phone].      "

## 2023-05-31 NOTE — TELEPHONE ENCOUNTER
Labs reviewed via portal and are stable.  Patient will repeat labs on 9/11/23 per protocol.     ----- Message from Bryson Brand MD sent at 5/26/2023  1:52 PM CDT -----  Results reviewed

## 2023-09-28 LAB
EXT ALBUMIN: 4.2
EXT ALKALINE PHOSPHATASE: 86
EXT ALT: 62
EXT AST: 60
EXT BASOPHIL%: 0.7
EXT BILIRUBIN TOTAL: 0.39
EXT BUN: 22.5
EXT CALCIUM: 9.4
EXT CHLORIDE: 103
EXT CO2: 26
EXT CREATININE: 1.28 MG/DL
EXT EGFR NO RACE VARIABLE: 46
EXT EOSINOPHIL%: 3.5
EXT GLUCOSE: 94
EXT HEMATOCRIT: 38.1
EXT HEMOGLOBIN: 12
EXT LYMPH%: 38.2
EXT MONOCYTES%: 8.4
EXT PLATELETS: 209
EXT POTASSIUM: 5
EXT PROTEIN TOTAL: 7.5
EXT SEGS%: 49
EXT SODIUM: 140 MMOL/L
EXT TACROLIMUS LVL: 5.4
EXT WBC: 4.5

## 2023-10-06 ENCOUNTER — TELEPHONE (OUTPATIENT)
Dept: TRANSPLANT | Facility: CLINIC | Age: 68
End: 2023-10-06
Payer: COMMERCIAL

## 2023-10-06 NOTE — TELEPHONE ENCOUNTER
Spoke with patient and reviewed lab results.  Pt states she has been drinking ROCK energy drinks and liver cleanse teas.  She states she will discontinue the use of these and will repeat labs 10/10/23.  Pt encouraged to speak with a nutritionist and/or doctor for weight loss program suitable for her health as a transplant patient.  Pt verbalized understanding.      ----- Message from Bryson Brand MD sent at 10/2/2023  3:59 PM CDT -----  LFTs are up - can we repeat this week?  Results reviewed

## 2023-10-12 LAB
EXT ALBUMIN: 4
EXT ALKALINE PHOSPHATASE: 84
EXT ALT: 23
EXT AST: 24
EXT BASOPHIL%: 0.7
EXT BILIRUBIN TOTAL: 0.4
EXT BUN: 21.7
EXT CALCIUM: 9.3
EXT CHLORIDE: 108
EXT CO2: 24
EXT CREATININE: 1.27 MG/DL
EXT EGFR NO RACE VARIABLE: 46
EXT EOSINOPHIL%: 3.6
EXT GLUCOSE: 100
EXT HEMATOCRIT: 38.4
EXT HEMOGLOBIN: 11.9
EXT LYMPH%: 34.5
EXT MONOCYTES%: 8.7
EXT PLATELETS: 223
EXT POTASSIUM: 5
EXT PROTEIN TOTAL: 7.1
EXT SEGS%: 52.5
EXT SODIUM: 141 MMOL/L
EXT TACROLIMUS LVL: 4.2
EXT WBC: 4.1

## 2023-10-19 ENCOUNTER — OFFICE VISIT (OUTPATIENT)
Dept: TRANSPLANT | Facility: CLINIC | Age: 68
End: 2023-10-19
Payer: MEDICARE

## 2023-10-19 VITALS
OXYGEN SATURATION: 97 % | BODY MASS INDEX: 32.6 KG/M2 | HEIGHT: 67 IN | DIASTOLIC BLOOD PRESSURE: 73 MMHG | WEIGHT: 207.69 LBS | SYSTOLIC BLOOD PRESSURE: 163 MMHG | RESPIRATION RATE: 18 BRPM | HEART RATE: 68 BPM

## 2023-10-19 DIAGNOSIS — I10 PRIMARY HYPERTENSION: ICD-10-CM

## 2023-10-19 DIAGNOSIS — Z94.4 LIVER REPLACED BY TRANSPLANT: ICD-10-CM

## 2023-10-19 DIAGNOSIS — Z94.4 LIVER TRANSPLANTED: ICD-10-CM

## 2023-10-19 DIAGNOSIS — Z29.89 PROPHYLACTIC IMMUNOTHERAPY: ICD-10-CM

## 2023-10-19 DIAGNOSIS — E66.9 CLASS 1 OBESITY WITHOUT SERIOUS COMORBIDITY WITH BODY MASS INDEX (BMI) OF 32.0 TO 32.9 IN ADULT, UNSPECIFIED OBESITY TYPE: ICD-10-CM

## 2023-10-19 PROCEDURE — 99999 PR PBB SHADOW E&M-EST. PATIENT-LVL III: CPT | Mod: PBBFAC,,, | Performed by: INTERNAL MEDICINE

## 2023-10-19 PROCEDURE — 99999 PR PBB SHADOW E&M-EST. PATIENT-LVL III: ICD-10-PCS | Mod: PBBFAC,,, | Performed by: INTERNAL MEDICINE

## 2023-10-19 PROCEDURE — 99213 OFFICE O/P EST LOW 20 MIN: CPT | Mod: PBBFAC | Performed by: INTERNAL MEDICINE

## 2023-10-19 PROCEDURE — 99213 PR OFFICE/OUTPT VISIT, EST, LEVL III, 20-29 MIN: ICD-10-PCS | Mod: S$PBB,,, | Performed by: INTERNAL MEDICINE

## 2023-10-19 PROCEDURE — 99213 OFFICE O/P EST LOW 20 MIN: CPT | Mod: S$PBB,,, | Performed by: INTERNAL MEDICINE

## 2023-10-19 NOTE — PROGRESS NOTES
Subjective:       Patient ID: Aletha Hopkins is a 68 y.o. female.    Chief Complaint: Liver Transplant     HPI   Aletha Hopkins is a 68 year old female, who underwent  donor LT on 2009 for autoimmune hepatitis related cirrhosis. She is now 14 years and 9 months post OLT.    ORGAN: LIVER  Whole or Partial: whole liver  Donor Type: donation after brain death  [facility] High Risk: no  Donor CMV Status: Positive  Donor HCV Status: Negative  Donor HBcAb: Negative  Donor HBV TICO:   Donor HCV TICO:   Biliary Anastomosis:   Arterial Anatomy:   IVC reconstruction:   Portal vein status:      She has done well with no complications since then other than mild renal impairment which has improved with a decrease in the dose of her tacrolimus.    She remains on tac 2 mg BID and MMF 500mg BID    Health Maintenance  -Bone mineral density - last BMD ~ May 2020  -Colonoscopy- reportedly normal ~ 2020  -Dermatology- educated on skin care     Covid vaccination x2- Moderna    Past Medical History:   Diagnosis Date    Liver transplanted 2009    Autoimmune     No past surgical history on file.  Current Outpatient Medications   Medication Sig    C/sourcherry/celery/grape seed (TART CHERRY ORAL) Take by mouth.    mycophenolate (CELLCEPT) 250 mg Cap TAKE TWO CAPSULES BY MOUTH EVERY 12 HOURS    tacrolimus (PROGRAF) 1 MG Cap TAKE TWO CAPSULES BY MOUTH EVERY 12 HOURS    ursodioL (ACTIGALL) 300 mg capsule TAKE 1 CAPSULE BY MOUTH TWICE DAILY    CALCIUM CARBONATE/VITAMIN D3 (CALCIUM WITH VITAMIN D3 ORAL) Take 1 capsule by mouth 2 (two) times daily. Bone strength calcium, magnesium, vit D3 and vit K    fish oil-omega-3 fatty acids 300-1,000 mg capsule Take 1 capsule by mouth once daily.    multivitamin (THERAGRAN) per tablet Take 1 tablet by mouth. 1 Tablet Oral Every day.  over the counter    TURMERIC ORAL Take 1 capsule by mouth once daily. Turmeric with zayda     No current facility-administered medications for this visit.        Objective:     Vitals:    10/19/23 0916   BP: (!) 163/73   Pulse: 68   Resp: 18   Body mass index is 32.53 kg/m².       Physical Exam  Vitals reviewed.   Constitutional:       Appearance: She is well-developed.   Eyes:      General: No scleral icterus.  Cardiovascular:      Rate and Rhythm: Normal rate.   Pulmonary:      Effort: Pulmonary effort is normal.   Abdominal:      General: Abdomen is protuberant. There is no distension.      Palpations: Abdomen is soft.      Tenderness: There is no abdominal tenderness.          Comments: Well-healed surgical scar.   Musculoskeletal:         General: Normal range of motion.   Neurological:      Mental Status: She is alert and oriented to person, place, and time.      Comments: No tremor.   Psychiatric:         Behavior: Behavior normal.         Assessment:       Aletha Hopkins is a 68 year old female, who underwent  donor LT on 2009 for autoimmune hepatitis related cirrhosis. She is now 14 years and 9 months post OLT. She is doing well.     1. Liver transplanted for AIH    2. Prophylactic immunotherapy    3. Class 1 obesity without serious comorbidity with body mass index (BMI) of 32.0 to 32.9 in adult, unspecified obesity type    4. Primary hypertension          Plan:     1. Continue with Tac  and MMF 500mg BID   2. Continue ursodiol 300mg BID for biliary prophylaxis  3. Patient is interested in pharmacological therapies for weight loss. No contraindications from a liver perspective. Recommended asking PCP for referral to local endocrinologist/weight management clinic.  4. Hypertension management per PCP  5. Counseled on avoiding OTC supplements (ie. green tea extract, turmeric extract)  6. Labs every 3 months    Clinic in 1 year.    Functional Status: 100% - Normal, no complaints, no evidence of disease  Physical Capacity: No Limitations    Louann Burrows MD  Staff Physician  Hepatology and Liver Transplant  [facility] [place]

## 2023-10-19 NOTE — LETTER
October 19, 2023                     [facility] 1st Fl  [address]  [facility] [address]  Phone: [phone]   Patient: Aletha Hopkins   MR Number: [id number]   YOB: 1955   Date of Visit: 10/19/2023       Dear      Thank you for referring Aletha Hopkins to me for evaluation. Attached you will find relevant portions of my assessment and plan of care.    If you have questions, please do not hesitate to call me. I look forward to following Aletha Hopkins along with you.    Sincerely,    Louann Burrows MD    Enclosure    If you would like to receive this communication electronically, please contact [email or web address] or [phone] to request [employer] Link access.    [employer] Link is a tool which provides read-only access to select patient information with whom you have a relationship. Its easy to use and provides real time access to review your patients record including encounter summaries, notes, results, and demographic information.    If you feel you have received this communication in error or would no longer like to receive these types of communications, please e-mail [email or web address]

## 2023-10-20 RX ORDER — URSODIOL 300 MG/1
CAPSULE ORAL
Qty: 60 CAPSULE | Refills: 6 | Status: SHIPPED | OUTPATIENT
Start: 2023-10-20

## 2023-11-01 ENCOUNTER — TELEPHONE (OUTPATIENT)
Dept: TRANSPLANT | Facility: CLINIC | Age: 68
End: 2023-11-01
Payer: MEDICARE

## 2023-11-01 ENCOUNTER — PATIENT MESSAGE (OUTPATIENT)
Dept: TRANSPLANT | Facility: CLINIC | Age: 68
End: 2023-11-01
Payer: MEDICARE

## 2023-11-01 NOTE — LETTER
November 1, 2023    Aletha Hopkins  [address]  [place] [address]          Dear Aletha Hopkins:  MRN: [id number]    This is a follow up to your recent labs, your lab results were stable.  There are no medicine changes.  Please have your labs drawn again on 1/29/24.      If you cannot have your labs drawn on the scheduled date, it is your responsibility to call the transplant department to reschedule.  Please call [phone] and ask to speak to [facility]  for all scheduling requests.     Sincerely,      Jennifer Mtz RN  Your Liver Transplant Coordinator    [facility] [place]  [facility] [place] [address]  [phone]

## 2023-11-01 NOTE — TELEPHONE ENCOUNTER
Labs reviewed and are stable.  Patient to repeat labs on 1/29/24.   Letter sent to patient.      ----- Message from Bryson Brand MD sent at 10/31/2023  3:28 PM CDT -----  Results reviewed

## 2024-01-31 LAB
EXT ALBUMIN: 4.4
EXT ALKALINE PHOSPHATASE: 93
EXT ALT: 18
EXT AST: 25
EXT BASOPHIL%: 0.7
EXT BILIRUBIN TOTAL: 0.52
EXT BUN: 20.2
EXT CALCIUM: 9.7
EXT CHLORIDE: 103
EXT CO2: 25
EXT CREATININE: 1.36 MG/DL
EXT EOSINOPHIL%: 2.5
EXT GLUCOSE: 102
EXT HEMATOCRIT: 39.1
EXT HEMOGLOBIN: 12.3
EXT LYMPH%: 36.4
EXT MONOCYTES%: 7.2
EXT PLATELETS: 239
EXT POTASSIUM: 4.3
EXT PROTEIN TOTAL: 7.7
EXT SEGS%: 53
EXT SODIUM: 139 MMOL/L
EXT TACROLIMUS LVL: 5.2
EXT WBC: 4.5

## 2024-02-02 ENCOUNTER — TELEPHONE (OUTPATIENT)
Dept: TRANSPLANT | Facility: CLINIC | Age: 69
End: 2024-02-02
Payer: MEDICARE

## 2024-02-02 NOTE — TELEPHONE ENCOUNTER
Labs reviewed and are stable.  Patient to repeat labs on 5/20/24.   Letter sent to patient.  -    ---- Message from Bryson Brand MD sent at 2/2/2024  8:38 AM CST -----  Results reviewed

## 2024-02-02 NOTE — LETTER
February 2, 2024    Aletha Hopkins  [address]  [place] [address]          Dear Aletha Hopkins:  MRN: [id number]    This is a follow up to your recent labs, your lab results were stable.  There are no medicine changes.  Please have your labs drawn again on 5/20/24.      If you cannot have your labs drawn on the scheduled date, it is your responsibility to call the transplant department to reschedule.  Please call [phone] and ask to speak to [facility]  for all scheduling requests.     Sincerely,      Jennifer Mtz RN  Your Liver Transplant Coordinator    [facility] [place]  [facility] [place] [address]  [phone]

## 2024-02-11 DIAGNOSIS — Z94.4 STATUS POST LIVER TRANSPLANT: ICD-10-CM

## 2024-02-12 RX ORDER — MYCOPHENOLATE MOFETIL 250 MG/1
500 CAPSULE ORAL 2 TIMES DAILY
Qty: 120 CAPSULE | Refills: 11 | Status: SHIPPED | OUTPATIENT
Start: 2024-02-12

## 2024-03-28 DIAGNOSIS — Z94.4 LIVER REPLACED BY TRANSPLANT: ICD-10-CM

## 2024-04-01 RX ORDER — TACROLIMUS 1 MG/1
2 CAPSULE ORAL EVERY 12 HOURS
Qty: 120 CAPSULE | Refills: 11 | Status: SHIPPED | OUTPATIENT
Start: 2024-04-01

## 2024-05-22 LAB
EXT ALBUMIN: 3.9
EXT ALKALINE PHOSPHATASE: 98
EXT ALT: 18
EXT AST: 23
EXT BASOPHIL%: 1
EXT BILIRUBIN TOTAL: 0.4
EXT BUN: 17.8
EXT CALCIUM: 9.7
EXT CHLORIDE: 105
EXT CO2: 27
EXT CREATININE: 1.15 MG/DL
EXT EGFR NO RACE VARIABLE: 52
EXT EOSINOPHIL%: 3.2
EXT GLUCOSE: 95
EXT HEMATOCRIT: 37.2
EXT HEMOGLOBIN: 11.9
EXT LYMPH%: 36.4
EXT MONOCYTES%: 8.7
EXT PLATELETS: 224
EXT POTASSIUM: 4.3
EXT PROTEIN TOTAL: 7.1
EXT SEGS%: 50.5
EXT SODIUM: 139 MMOL/L
EXT TACROLIMUS LVL: 3
EXT WBC: 4

## 2024-05-27 DIAGNOSIS — Z94.4 LIVER REPLACED BY TRANSPLANT: ICD-10-CM

## 2024-05-27 NOTE — LETTER
May 28, 2024    Aletha Hopkins  [address]  [place] [address]          Dear Aletha Hopkins:  MRN: [id number]    This is a follow up to your recent labs, your lab results were stable.  There are no medicine changes.  Please have your labs drawn again on 8/26/24.      If you cannot have your labs drawn on the scheduled date, it is your responsibility to call the transplant department to reschedule.  Please call [phone] and ask to speak to Crystal Pierson, Medical Assistant for all scheduling requests.     Sincerely,      Jennifer Mtz RN  Your Liver Transplant Coordinator    [facility] [place]  [facility] [place] [address]  [phone]       
[phone]

## 2024-05-28 RX ORDER — URSODIOL 300 MG/1
CAPSULE ORAL
Qty: 60 CAPSULE | Refills: 6 | Status: SHIPPED | OUTPATIENT
Start: 2024-05-28

## 2024-05-28 NOTE — TELEPHONE ENCOUNTER
Labs reviewed and are stable.  Patient to repeat labs on 8/26/24   Letter sent to patient.        ----- Message from Bryson Brand MD sent at 5/24/2024 12:57 PM CDT -----  Results reviewed

## 2024-08-27 LAB
EXT ALBUMIN: 4.4
EXT ALKALINE PHOSPHATASE: 101
EXT ALT: 22
EXT AST: 24
EXT BASOPHIL%: 0.6
EXT BILIRUBIN TOTAL: 0.43
EXT BUN: 34.2
EXT CALCIUM: 10.4
EXT CHLORIDE: 102
EXT CO2: 24
EXT CREATININE: 1.57 MG/DL
EXT EGFR NO RACE VARIABLE: 36
EXT EOSINOPHIL%: 1.9
EXT GLUCOSE: 94
EXT HEMATOCRIT: 40.1
EXT HEMOGLOBIN: 12.5
EXT LYMPH%: 21
EXT MONOCYTES%: 5
EXT PLATELETS: 241
EXT POTASSIUM: 4.5
EXT PROTEIN TOTAL: 7.9
EXT SEGS%: 71.1
EXT SODIUM: 138 MMOL/L
EXT TACROLIMUS LVL: 3.8
EXT WBC: 5.2

## 2024-09-03 ENCOUNTER — TELEPHONE (OUTPATIENT)
Dept: TRANSPLANT | Facility: CLINIC | Age: 69
End: 2024-09-03
Payer: MEDICARE

## 2024-09-05 ENCOUNTER — TELEPHONE (OUTPATIENT)
Dept: TRANSPLANT | Facility: CLINIC | Age: 69
End: 2024-09-05
Payer: MEDICARE

## 2024-09-05 NOTE — TELEPHONE ENCOUNTER
Labs reviewed via portal and are stable.  Patient will repeat labs on 12/16/24.        ----- Message from Bryson Brand MD sent at 8/27/2024  4:23 PM CDT -----  Results reviewed

## 2024-10-30 ENCOUNTER — OFFICE VISIT (OUTPATIENT)
Dept: TRANSPLANT | Facility: CLINIC | Age: 69
End: 2024-10-30
Payer: MEDICARE

## 2024-10-30 VITALS
SYSTOLIC BLOOD PRESSURE: 176 MMHG | OXYGEN SATURATION: 99 % | RESPIRATION RATE: 16 BRPM | WEIGHT: 179.69 LBS | DIASTOLIC BLOOD PRESSURE: 72 MMHG | HEART RATE: 58 BPM | BODY MASS INDEX: 28.2 KG/M2 | TEMPERATURE: 97 F | HEIGHT: 67 IN

## 2024-10-30 DIAGNOSIS — Z94.4 LIVER TRANSPLANTED: ICD-10-CM

## 2024-10-30 DIAGNOSIS — I10 PRIMARY HYPERTENSION: ICD-10-CM

## 2024-10-30 DIAGNOSIS — Z29.89 PROPHYLACTIC IMMUNOTHERAPY: ICD-10-CM

## 2024-10-30 DIAGNOSIS — Z86.39 HISTORY OF OBESITY: ICD-10-CM

## 2024-10-30 PROCEDURE — 99213 OFFICE O/P EST LOW 20 MIN: CPT | Mod: PBBFAC | Performed by: INTERNAL MEDICINE

## 2024-10-30 PROCEDURE — 99214 OFFICE O/P EST MOD 30 MIN: CPT | Mod: S$PBB,,, | Performed by: INTERNAL MEDICINE

## 2024-10-30 PROCEDURE — 99999 PR PBB SHADOW E&M-EST. PATIENT-LVL III: CPT | Mod: PBBFAC,,, | Performed by: INTERNAL MEDICINE

## 2024-12-12 ENCOUNTER — PATIENT MESSAGE (OUTPATIENT)
Dept: TRANSPLANT | Facility: CLINIC | Age: 69
End: 2024-12-12
Payer: MEDICARE

## 2024-12-23 LAB
EXT ALBUMIN: 4.1
EXT ALKALINE PHOSPHATASE: 91
EXT ALT: 18
EXT AST: 19
EXT BASOPHIL%: 0.9
EXT BILIRUBIN TOTAL: 0.47
EXT BUN: 27.2
EXT CALCIUM: 9.7
EXT CHLORIDE: 102
EXT CO2: 25
EXT CREATININE: 1.38 MG/DL
EXT EGFR NO RACE VARIABLE: 42
EXT EOSINOPHIL%: 3.7
EXT GLUCOSE: 92
EXT HEMATOCRIT: 38.7
EXT HEMOGLOBIN: 12.1
EXT LYMPH%: 44.2
EXT MONOCYTES%: 10.7
EXT PLATELETS: 226
EXT POTASSIUM: 4.6
EXT PROTEIN TOTAL: 7.6
EXT SEGS%: 40.2
EXT SODIUM: 138 MMOL/L
EXT TACROLIMUS LVL: 2.9
EXT WBC: 3.3

## 2024-12-26 ENCOUNTER — TELEPHONE (OUTPATIENT)
Dept: TRANSPLANT | Facility: CLINIC | Age: 69
End: 2024-12-26
Payer: MEDICARE

## 2024-12-26 NOTE — TELEPHONE ENCOUNTER
Labs reviewed via portal and are stable.  Patient will repeat labs on 1/6/25 per protocol.        ----- Message from Louann Burrows MD sent at 12/23/2024  4:27 PM CST -----  Labs reviewed. No changes to meds. Please repeat labs in 2 weeks. Thanks.

## 2025-01-09 LAB
EXT ALBUMIN: 4.4
EXT ALKALINE PHOSPHATASE: 95
EXT ALT: 19
EXT AST: 21
EXT BASOPHIL%: 1
EXT BILIRUBIN TOTAL: 0.35
EXT BUN: 32.6
EXT CALCIUM: 10.4
EXT CHLORIDE: 103
EXT CO2: 27
EXT CREATININE: 1.38 MG/DL
EXT EGFR NO RACE VARIABLE: 42
EXT EOSINOPHIL%: 3.7
EXT GLUCOSE: 98
EXT HEMATOCRIT: 40.1
EXT HEMOGLOBIN: 12.6
EXT LYMPH%: 47.3
EXT MONOCYTES%: 8.6
EXT PLATELETS: 221
EXT POTASSIUM: 5
EXT PROTEIN TOTAL: 7.6
EXT SEGS%: 39.2
EXT SODIUM: 139 MMOL/L
EXT TACROLIMUS LVL: 3.3
EXT WBC: 4.1

## 2025-01-12 ENCOUNTER — TELEPHONE (OUTPATIENT)
Dept: TRANSPLANT | Facility: CLINIC | Age: 70
End: 2025-01-12
Payer: MEDICARE

## 2025-01-12 NOTE — TELEPHONE ENCOUNTER
Labs reviewed via portal and are stable.  Patient will repeat labs on 4/28/25.        ----- Message from Louann Burrows MD sent at 1/10/2025  3:49 PM CST -----  Labs reviewed. No changes at this time.

## 2025-04-15 DIAGNOSIS — Z94.4 STATUS POST LIVER TRANSPLANT: ICD-10-CM

## 2025-04-15 DIAGNOSIS — Z94.4 LIVER REPLACED BY TRANSPLANT: ICD-10-CM

## 2025-04-17 RX ORDER — URSODIOL 300 MG/1
300 CAPSULE ORAL 2 TIMES DAILY
Qty: 60 CAPSULE | Refills: 6 | Status: SHIPPED | OUTPATIENT
Start: 2025-04-17

## 2025-04-17 RX ORDER — MYCOPHENOLATE MOFETIL 250 MG/1
500 CAPSULE ORAL 2 TIMES DAILY
Qty: 120 CAPSULE | Refills: 11 | Status: SHIPPED | OUTPATIENT
Start: 2025-04-17

## 2025-04-17 RX ORDER — TACROLIMUS 1 MG/1
2 CAPSULE ORAL EVERY 12 HOURS
Qty: 120 CAPSULE | Refills: 11 | Status: SHIPPED | OUTPATIENT
Start: 2025-04-17

## 2025-05-14 ENCOUNTER — TELEPHONE (OUTPATIENT)
Dept: TRANSPLANT | Facility: CLINIC | Age: 70
End: 2025-05-14
Payer: MEDICARE

## 2025-05-14 NOTE — TELEPHONE ENCOUNTER
Spoke to pt regarding recent labs with only a tacro level drawn.  Pt states lab told her there was only 1 tube needed for blood draw.  Pt will get labs done tomorrow 5/15/25.  Current lab orders have been re-faxed to [facility] lab @ [phone].

## 2025-06-17 ENCOUNTER — RESULTS FOLLOW-UP (OUTPATIENT)
Dept: HEPATOLOGY | Facility: CLINIC | Age: 70
End: 2025-06-17
Payer: MEDICARE

## 2025-06-17 LAB
EXT ALBUMIN: 4.3
EXT ALKALINE PHOSPHATASE: 93
EXT ALT: 18
EXT AST: 25
EXT BASOPHIL%: 1.2
EXT BILIRUBIN TOTAL: 0.5
EXT BUN: 31
EXT CALCIUM: 10.3
EXT CHLORIDE: 100
EXT CO2: 25
EXT CREATININE: 1.83 MG/DL
EXT EGFR NO RACE VARIABLE: 30
EXT EOSINOPHIL%: 2.7
EXT GLUCOSE: 109
EXT HEMATOCRIT: 39
EXT HEMOGLOBIN: 12.3
EXT LYMPH%: 46.4
EXT MONOCYTES%: 8.2
EXT PLATELETS: 236
EXT POTASSIUM: 4.7
EXT PROTEIN TOTAL: 7.9
EXT SEGS%: 41.2
EXT SODIUM: 137 MMOL/L
EXT TACROLIMUS LVL: 3.7
EXT WBC: 3.3

## 2025-06-18 NOTE — TELEPHONE ENCOUNTER
Labs reviewed via portal and are stable.  Patient will repeat labs on 9/8/25 per protocol.        ----- Message from Louann Burrows MD sent at 6/17/2025  1:03 PM CDT -----  Labs reviewed. No changes.  ----- Message -----  From: Jennifer Mtz RN  Sent: 6/17/2025  10:52 AM CDT  To: Louann Burrows MD     What Type Of Note Output Would You Prefer (Optional)?: Standard Output How Severe Is Your Skin Lesion?: moderate Has Your Skin Lesion Been Treated?: not been treated Is This A New Presentation, Or A Follow-Up?: Skin Lesion

## 2025-08-18 ENCOUNTER — PATIENT MESSAGE (OUTPATIENT)
Dept: TRANSPLANT | Facility: CLINIC | Age: 70
End: 2025-08-18
Payer: MEDICARE